# Patient Record
Sex: FEMALE | Race: BLACK OR AFRICAN AMERICAN | NOT HISPANIC OR LATINO
[De-identification: names, ages, dates, MRNs, and addresses within clinical notes are randomized per-mention and may not be internally consistent; named-entity substitution may affect disease eponyms.]

---

## 2018-06-26 PROBLEM — Z00.00 ENCOUNTER FOR PREVENTIVE HEALTH EXAMINATION: Status: ACTIVE | Noted: 2018-06-26

## 2018-06-27 ENCOUNTER — APPOINTMENT (OUTPATIENT)
Dept: ORTHOPEDIC SURGERY | Facility: CLINIC | Age: 83
End: 2018-06-27
Payer: MEDICARE

## 2018-06-27 DIAGNOSIS — S43.004A UNSPECIFIED DISLOCATION OF RIGHT SHOULDER JOINT, INITIAL ENCOUNTER: ICD-10-CM

## 2018-06-27 DIAGNOSIS — M17.12 UNILATERAL PRIMARY OSTEOARTHRITIS, LEFT KNEE: ICD-10-CM

## 2018-06-27 DIAGNOSIS — M17.11 UNILATERAL PRIMARY OSTEOARTHRITIS, RIGHT KNEE: ICD-10-CM

## 2018-06-27 PROCEDURE — 73562 X-RAY EXAM OF KNEE 3: CPT | Mod: 50

## 2018-06-27 PROCEDURE — 73030 X-RAY EXAM OF SHOULDER: CPT | Mod: RT

## 2018-06-27 PROCEDURE — 99205 OFFICE O/P NEW HI 60 MIN: CPT | Mod: 25

## 2018-06-27 PROCEDURE — 20615 TREATMENT OF BONE CYST: CPT

## 2018-07-12 ENCOUNTER — APPOINTMENT (OUTPATIENT)
Dept: ORTHOPEDIC SURGERY | Facility: CLINIC | Age: 83
End: 2018-07-12
Payer: MEDICARE

## 2018-07-12 VITALS
BODY MASS INDEX: 19.32 KG/M2 | HEIGHT: 62 IN | SYSTOLIC BLOOD PRESSURE: 144 MMHG | DIASTOLIC BLOOD PRESSURE: 76 MMHG | WEIGHT: 105 LBS

## 2018-07-12 DIAGNOSIS — Z78.9 OTHER SPECIFIED HEALTH STATUS: ICD-10-CM

## 2018-07-12 DIAGNOSIS — Z60.2 PROBLEMS RELATED TO LIVING ALONE: ICD-10-CM

## 2018-07-12 DIAGNOSIS — Z56.0 UNEMPLOYMENT, UNSPECIFIED: ICD-10-CM

## 2018-07-12 DIAGNOSIS — M19.011 PRIMARY OSTEOARTHRITIS, RIGHT SHOULDER: ICD-10-CM

## 2018-07-12 DIAGNOSIS — M75.121 COMPLETE ROTATOR CUFF TEAR OR RUPTURE OF RIGHT SHOULDER, NOT SPECIFIED AS TRAUMATIC: ICD-10-CM

## 2018-07-12 PROCEDURE — 99214 OFFICE O/P EST MOD 30 MIN: CPT

## 2018-07-12 SDOH — ECONOMIC STABILITY - INCOME SECURITY: UNEMPLOYMENT, UNSPECIFIED: Z56.0

## 2018-07-12 SDOH — SOCIAL STABILITY - SOCIAL INSECURITY: PROBLEMS RELATED TO LIVING ALONE: Z60.2

## 2018-07-13 PROBLEM — M19.011 OSTEOARTHRITIS OF RIGHT SHOULDER: Status: ACTIVE | Noted: 2018-07-13

## 2018-07-13 PROBLEM — M75.121 COMPLETE TEAR OF RIGHT ROTATOR CUFF: Status: ACTIVE | Noted: 2018-07-13

## 2018-10-03 ENCOUNTER — EMERGENCY (EMERGENCY)
Facility: HOSPITAL | Age: 83
LOS: 1 days | Discharge: HOME | End: 2018-10-03
Attending: EMERGENCY MEDICINE | Admitting: EMERGENCY MEDICINE
Payer: MEDICARE

## 2018-10-03 VITALS
RESPIRATION RATE: 16 BRPM | DIASTOLIC BLOOD PRESSURE: 69 MMHG | SYSTOLIC BLOOD PRESSURE: 126 MMHG | OXYGEN SATURATION: 100 % | TEMPERATURE: 99 F | HEART RATE: 67 BPM

## 2018-10-03 LAB
ALBUMIN SERPL ELPH-MCNC: 3.8 G/DL — SIGNIFICANT CHANGE UP (ref 3.3–5)
ALP SERPL-CCNC: 165 U/L — HIGH (ref 40–120)
ALT FLD-CCNC: 16 U/L — SIGNIFICANT CHANGE UP (ref 4–33)
AST SERPL-CCNC: 23 U/L — SIGNIFICANT CHANGE UP (ref 4–32)
BASOPHILS # BLD AUTO: 0.04 K/UL — SIGNIFICANT CHANGE UP (ref 0–0.2)
BASOPHILS NFR BLD AUTO: 0.7 % — SIGNIFICANT CHANGE UP (ref 0–2)
BILIRUB SERPL-MCNC: 0.3 MG/DL — SIGNIFICANT CHANGE UP (ref 0.2–1.2)
BUN SERPL-MCNC: 29 MG/DL — HIGH (ref 7–23)
CALCIUM SERPL-MCNC: 9.5 MG/DL — SIGNIFICANT CHANGE UP (ref 8.4–10.5)
CHLORIDE SERPL-SCNC: 105 MMOL/L — SIGNIFICANT CHANGE UP (ref 98–107)
CO2 SERPL-SCNC: 22 MMOL/L — SIGNIFICANT CHANGE UP (ref 22–31)
CREAT SERPL-MCNC: 0.71 MG/DL — SIGNIFICANT CHANGE UP (ref 0.5–1.3)
EOSINOPHIL # BLD AUTO: 0.12 K/UL — SIGNIFICANT CHANGE UP (ref 0–0.5)
EOSINOPHIL NFR BLD AUTO: 2.1 % — SIGNIFICANT CHANGE UP (ref 0–6)
ERYTHROCYTE [SEDIMENTATION RATE] IN BLOOD: 12 MM/HR — SIGNIFICANT CHANGE UP (ref 4–25)
GLUCOSE SERPL-MCNC: 78 MG/DL — SIGNIFICANT CHANGE UP (ref 70–99)
HCT VFR BLD CALC: 39.4 % — SIGNIFICANT CHANGE UP (ref 34.5–45)
HGB BLD-MCNC: 12.1 G/DL — SIGNIFICANT CHANGE UP (ref 11.5–15.5)
IMM GRANULOCYTES # BLD AUTO: 0.01 # — SIGNIFICANT CHANGE UP
IMM GRANULOCYTES NFR BLD AUTO: 0.2 % — SIGNIFICANT CHANGE UP (ref 0–1.5)
LYMPHOCYTES # BLD AUTO: 1.65 K/UL — SIGNIFICANT CHANGE UP (ref 1–3.3)
LYMPHOCYTES # BLD AUTO: 28.6 % — SIGNIFICANT CHANGE UP (ref 13–44)
MCHC RBC-ENTMCNC: 30.7 % — LOW (ref 32–36)
MCHC RBC-ENTMCNC: 31.3 PG — SIGNIFICANT CHANGE UP (ref 27–34)
MCV RBC AUTO: 101.8 FL — HIGH (ref 80–100)
MONOCYTES # BLD AUTO: 0.56 K/UL — SIGNIFICANT CHANGE UP (ref 0–0.9)
MONOCYTES NFR BLD AUTO: 9.7 % — SIGNIFICANT CHANGE UP (ref 2–14)
NEUTROPHILS # BLD AUTO: 3.38 K/UL — SIGNIFICANT CHANGE UP (ref 1.8–7.4)
NEUTROPHILS NFR BLD AUTO: 58.7 % — SIGNIFICANT CHANGE UP (ref 43–77)
NRBC # FLD: 0 — SIGNIFICANT CHANGE UP
PLATELET # BLD AUTO: 227 K/UL — SIGNIFICANT CHANGE UP (ref 150–400)
PMV BLD: 9.7 FL — SIGNIFICANT CHANGE UP (ref 7–13)
POTASSIUM SERPL-MCNC: 4.3 MMOL/L — SIGNIFICANT CHANGE UP (ref 3.5–5.3)
POTASSIUM SERPL-SCNC: 4.3 MMOL/L — SIGNIFICANT CHANGE UP (ref 3.5–5.3)
PROT SERPL-MCNC: 7.3 G/DL — SIGNIFICANT CHANGE UP (ref 6–8.3)
RBC # BLD: 3.87 M/UL — SIGNIFICANT CHANGE UP (ref 3.8–5.2)
RBC # FLD: 12.9 % — SIGNIFICANT CHANGE UP (ref 10.3–14.5)
SODIUM SERPL-SCNC: 141 MMOL/L — SIGNIFICANT CHANGE UP (ref 135–145)
WBC # BLD: 5.76 K/UL — SIGNIFICANT CHANGE UP (ref 3.8–10.5)
WBC # FLD AUTO: 5.76 K/UL — SIGNIFICANT CHANGE UP (ref 3.8–10.5)

## 2018-10-03 PROCEDURE — 99284 EMERGENCY DEPT VISIT MOD MDM: CPT

## 2018-10-03 PROCEDURE — 73521 X-RAY EXAM HIPS BI 2 VIEWS: CPT | Mod: 26

## 2018-10-03 PROCEDURE — 73660 X-RAY EXAM OF TOE(S): CPT | Mod: 26,RT

## 2018-10-03 RX ORDER — ACETAMINOPHEN 500 MG
650 TABLET ORAL ONCE
Qty: 0 | Refills: 0 | Status: COMPLETED | OUTPATIENT
Start: 2018-10-03 | End: 2018-10-03

## 2018-10-03 RX ADMIN — Medication 650 MILLIGRAM(S): at 17:26

## 2018-10-03 NOTE — CONSULT NOTE ADULT - ASSESSMENT
87 year old F w/ R hallux wound  - pt seen and eval  - Local anesthesia was obtained about R hallux in H block and R great toenail was removed. 1 cc of purulence was expressed under nail. Pyogenic granuloma was noted and hemostasis acheived.   - pt tolerated procedure well no complications.   - no active signs of infection or cellulitis  - Wound dressed with triple antibiotic ointment and dsd. Pt given surgical shoe  - Recommend discharge on PO clinda for 1 week  - Pt instructed to leave dressing intact for 24 hours then change dressing daily with ointment.  - pt instructed to return to ED if any signs of infection  - Pt to follow up with podiatry in one week. Please call [phone]

## 2018-10-03 NOTE — ED ADULT TRIAGE NOTE - CHIEF COMPLAINT QUOTE
c/o right foot great toe pain, discoloration, and wound s/p fall in June and right hip pain with difficulty ambulating. (+) right arm splint in place, and receiving therapy for right shoulder dislocation and ligament damage. No LOC.

## 2018-10-03 NOTE — ED PROVIDER NOTE - PROGRESS NOTE DETAILS
PA SABINA: Patient reassessed, sitting comfortably in chair in NAD, denies any complaints. Pt seen & evaluated by podiatry, I&D with small amount of pus, recommends clindamycin and outpatient f/u. Discussed with attending, patient can be discharged home to f/u with PCP and podiatry. The patient was given verbal and written discharge instructions. Specifically, instructions when to return to the ED and when to seek follow-up from their pcp was discussed. Any specialty follow-up was discussed, including how to make an appointment.  Instructions were discussed in simple, plain language and was understood by the patient. The patient understands that should their symptoms worsen or any new symptoms arise, they should return to the ED immediately for further evaluation. All pt's questions were answered. Patient verbalizes understanding.

## 2018-10-03 NOTE — CONSULT NOTE ADULT - SUBJECTIVE AND OBJECTIVE BOX
Podiatry pager #: [phone]/ [phone]    Patient is a 87y old  Female who presents with a chief complaint of right hallux wound    86 yo F with PMH of arthritis  who presents to the ER c/o right 1st toe wound and pain for duration of 2 weeks atraumatic; also right hip pain s/p fall in June. Reports right big toe looks funny w/ discoloration, pain with ambulation, no injury.  Reports having right hip bone pain after she trip and fell at home more than 3 months ago, pain with sitting and walking. As per sister, patient has right shoulder dislocation and ligament tear, treated by orthopedic, doing occupation therapy. Denies any fever, chills, n/v/d/c, headache, dizziness, chest pain, sob, abdominal pain, dysuria,  recent travel or any other complaints.      PAST MEDICAL & SURGICAL HISTORY:  Arthritis  No significant past surgical history      MEDICATIONS  (STANDING):    MEDICATIONS  (PRN):      Allergies    No Known Allergies    Intolerances        VITALS:    Vital Signs Last 24 Hrs  T(C): 37.1 (03 Oct 2018 14:47), Max: 37.1 (03 Oct 2018 14:47)  T(F): 98.7 (03 Oct 2018 14:47), Max: 98.7 (03 Oct 2018 14:47)  HR: 67 (03 Oct 2018 14:47) (67 - 67)  BP: 126/69 (03 Oct 2018 14:47) (126/69 - 126/69)  BP(mean): --  RR: 16 (03 Oct 2018 14:47) (16 - 16)  SpO2: 100% (03 Oct 2018 14:47) (100% - 100%)    LABS:                          12.1   5.76  )-----------( 227      ( 03 Oct 2018 17:30 )             39.4       10-03    141  |  105  |  29<H>  ----------------------------<  78  4.3   |  22  |  0.71    Ca    9.5      03 Oct 2018 17:30    TPro  7.3  /  Alb  3.8  /  TBili  0.3  /  DBili  x   /  AST  23  /  ALT  16  /  AlkPhos  165<H>  10-03      CAPILLARY BLOOD GLUCOSE              LOWER EXTREMITY PHYSICAL EXAM:    Vasular: DP/PT _/4, B/L, CFT <_ seconds B/L, Temperature gradient _, B/L.   Neuro: Epicritic sensation _ to the level of _, B/L.  Musculoskeletal/Ortho:  Skin:  Wound #1:   Location:  Size:  Depth:  Wound bed:   Drainage:   Odor:   Periwound:  Etiology:     RADIOLOGY & ADDITIONAL STUDIES:

## 2018-10-03 NOTE — ED PROVIDER NOTE - CARE PLAN
Principal Discharge DX:	Abscess  Assessment and plan of treatment:	Follow up with your PMD and podiatry in 48 hours. Keep wound covered and dry.  Take Clindamycin 300mg every 6 hours for 1 week. Take Tylenol 650mg (Two 325 mg pills) every 4-6 hours as needed for pain. any increased pain, redness, fever, chills or any new concerning symptoms return to the emergency department. Principal Discharge DX:	Abscess  Assessment and plan of treatment:	Follow up with your PMD 48 hours and f/u w/ podiatry in 1 week. Keep wound covered and dry.  Take Clindamycin 300mg every 6 hours for 1 week. Take Tylenol 650mg (Two 325 mg pills) every 4-6 hours as needed for pain. any increased pain, redness, fever, chills or any new concerning symptoms return to the emergency department.

## 2018-10-03 NOTE — ED PROVIDER NOTE - ATTENDING CONTRIBUTION TO CARE
I performed a face-to-face evaluation of the patient and performed a history and physical examination. I agree with the history and physical examination.    Mery: Older F, DM, R great toe open wound w/ pus and pain. Pus noted on exam, also likely pyogenic granuloma. Plan: Podiatry, I & D, IV ABx, xray.

## 2018-10-03 NOTE — ED PROVIDER NOTE - MEDICAL DECISION MAKING DETAILS
Mery: Older F, DM, R great toe open wound w/ pus and pain. Pus noted on exam, also likely pyogenic granuloma. Plan: Podiatry, I & D, IV ABx, xray.

## 2018-10-03 NOTE — ED PROVIDER NOTE - SKIN AREA #1
1.5cm open wound tip of right 1st toe with pus drainage on exam, +edema and tenderness, no surround erythema. pitting edema of right foot and leg

## 2018-10-03 NOTE — ED PROVIDER NOTE - PLAN OF CARE
Follow up with your PMD and podiatry in 48 hours. Keep wound covered and dry.  Take Clindamycin 300mg every 6 hours for 1 week. Take Tylenol 650mg (Two 325 mg pills) every 4-6 hours as needed for pain. any increased pain, redness, fever, chills or any new concerning symptoms return to the emergency department. Follow up with your PMD 48 hours and f/u w/ podiatry in 1 week. Keep wound covered and dry.  Take Clindamycin 300mg every 6 hours for 1 week. Take Tylenol 650mg (Two 325 mg pills) every 4-6 hours as needed for pain. any increased pain, redness, fever, chills or any new concerning symptoms return to the emergency department.

## 2018-10-03 NOTE — ED PROVIDER NOTE - OBJECTIVE STATEMENT
88 yo F with PMH of arthritis  who presents to the ER c/o right 1st toe wound and pain for duration of 2 weeks atraumatic; also right hip pain s/p fall in June. Reports right big toe looks funny w/ discoloration, pain with ambulation, no injury.  Reports having right hip bone pain after she trip and fell at home more than 3 months ago, pain with sitting and walking. As per sister, patient has right shoulder dislocation and ligament tear, treated by orthopedic, doing occupation therapy. Denies any fever, chills, n/v/d/c, headache, dizziness, chest pain, sob, abdominal pain, dysuria,  recent travel or any other complaints.

## 2018-10-31 PROBLEM — M19.90 UNSPECIFIED OSTEOARTHRITIS, UNSPECIFIED SITE: Chronic | Status: ACTIVE | Noted: 2018-10-03

## 2018-11-12 ENCOUNTER — APPOINTMENT (OUTPATIENT)
Dept: ORTHOPEDIC SURGERY | Facility: CLINIC | Age: 83
End: 2018-11-12
Payer: MEDICARE

## 2018-11-12 VITALS
DIASTOLIC BLOOD PRESSURE: 62 MMHG | WEIGHT: 105 LBS | BODY MASS INDEX: 19.32 KG/M2 | HEIGHT: 62 IN | SYSTOLIC BLOOD PRESSURE: 122 MMHG | HEART RATE: 60 BPM

## 2018-11-12 DIAGNOSIS — M24.411 RECURRENT DISLOCATION, RIGHT SHOULDER: ICD-10-CM

## 2018-11-12 PROCEDURE — 99213 OFFICE O/P EST LOW 20 MIN: CPT

## 2018-11-13 PROBLEM — M24.411: Status: ACTIVE | Noted: 2018-07-13

## 2018-12-08 ENCOUNTER — INPATIENT (INPATIENT)
Facility: HOSPITAL | Age: 83
LOS: 4 days | Discharge: NURSING, REHAB OR LONG-TERM CARE | End: 2018-12-13
Attending: INTERNAL MEDICINE | Admitting: INTERNAL MEDICINE
Payer: MEDICARE

## 2018-12-08 VITALS
HEIGHT: 61 IN | HEART RATE: 63 BPM | SYSTOLIC BLOOD PRESSURE: 139 MMHG | DIASTOLIC BLOOD PRESSURE: 75 MMHG | RESPIRATION RATE: 17 BRPM | OXYGEN SATURATION: 100 % | WEIGHT: 89.95 LBS

## 2018-12-08 PROCEDURE — 76377 3D RENDER W/INTRP POSTPROCES: CPT | Mod: 26

## 2018-12-08 PROCEDURE — 72125 CT NECK SPINE W/O DYE: CPT | Mod: 26

## 2018-12-08 PROCEDURE — 99285 EMERGENCY DEPT VISIT HI MDM: CPT

## 2018-12-08 PROCEDURE — 70450 CT HEAD/BRAIN W/O DYE: CPT | Mod: 26

## 2018-12-08 RX ORDER — SODIUM CHLORIDE 9 MG/ML
1000 INJECTION INTRAMUSCULAR; INTRAVENOUS; SUBCUTANEOUS ONCE
Qty: 0 | Refills: 0 | Status: COMPLETED | OUTPATIENT
Start: 2018-12-08 | End: 2018-12-08

## 2018-12-08 NOTE — ED ADULT NURSE NOTE - NEURO WDL
Alert and oriented to person, place and not time, memory intact, behavior appropriate to situation, PERRL.

## 2018-12-08 NOTE — ED ADULT NURSE NOTE - CHPI ED NUR SYMPTOMS NEG
no vomiting/no bleeding/no weakness/no deformity/no fever/no numbness/no abrasion/no loss of consciousness/no tingling

## 2018-12-08 NOTE — ED ADULT TRIAGE NOTE - CHIEF COMPLAINT QUOTE
pt found on floor by family pt stated she did not fall went down while putting her shoes ,  denies pain only c/o right shoulder pain duo to dislocated 6 weeks ago

## 2018-12-09 DIAGNOSIS — R74.8 ABNORMAL LEVELS OF OTHER SERUM ENZYMES: ICD-10-CM

## 2018-12-09 DIAGNOSIS — Z29.9 ENCOUNTER FOR PROPHYLACTIC MEASURES, UNSPECIFIED: ICD-10-CM

## 2018-12-09 DIAGNOSIS — Z65.9 PROBLEM RELATED TO UNSPECIFIED PSYCHOSOCIAL CIRCUMSTANCES: ICD-10-CM

## 2018-12-09 DIAGNOSIS — T68.XXXA HYPOTHERMIA, INITIAL ENCOUNTER: ICD-10-CM

## 2018-12-09 LAB
ALBUMIN SERPL ELPH-MCNC: 2.4 G/DL — LOW (ref 3.3–5)
ALP SERPL-CCNC: 135 U/L — HIGH (ref 40–120)
ALT FLD-CCNC: 28 U/L — SIGNIFICANT CHANGE UP (ref 12–78)
ANION GAP SERPL CALC-SCNC: 7 MMOL/L — SIGNIFICANT CHANGE UP (ref 5–17)
APTT BLD: 29.1 SEC — SIGNIFICANT CHANGE UP (ref 28.5–37)
AST SERPL-CCNC: 34 U/L — SIGNIFICANT CHANGE UP (ref 15–37)
BASOPHILS # BLD AUTO: 0.01 K/UL — SIGNIFICANT CHANGE UP (ref 0–0.2)
BASOPHILS # BLD AUTO: 0.01 K/UL — SIGNIFICANT CHANGE UP (ref 0–0.2)
BASOPHILS NFR BLD AUTO: 0.3 % — SIGNIFICANT CHANGE UP (ref 0–2)
BASOPHILS NFR BLD AUTO: 0.4 % — SIGNIFICANT CHANGE UP (ref 0–2)
BILIRUB SERPL-MCNC: 0.2 MG/DL — SIGNIFICANT CHANGE UP (ref 0.2–1.2)
BUN SERPL-MCNC: 22 MG/DL — SIGNIFICANT CHANGE UP (ref 7–23)
CALCIUM SERPL-MCNC: 8.3 MG/DL — LOW (ref 8.5–10.1)
CHLORIDE SERPL-SCNC: 109 MMOL/L — HIGH (ref 96–108)
CHOLEST SERPL-MCNC: 139 MG/DL — SIGNIFICANT CHANGE UP (ref 10–199)
CK MB BLD-MCNC: 5.3 % — HIGH (ref 0–3.5)
CK MB BLD-MCNC: 5.5 % — HIGH (ref 0–3.5)
CK MB CFR SERPL CALC: 11.9 NG/ML — HIGH (ref 0.5–3.6)
CK MB CFR SERPL CALC: 8.4 NG/ML — HIGH (ref 0.5–3.6)
CK SERPL-CCNC: 154 U/L — SIGNIFICANT CHANGE UP (ref 26–192)
CK SERPL-CCNC: 223 U/L — HIGH (ref 26–192)
CO2 SERPL-SCNC: 26 MMOL/L — SIGNIFICANT CHANGE UP (ref 22–31)
CREAT SERPL-MCNC: 0.58 MG/DL — SIGNIFICANT CHANGE UP (ref 0.5–1.3)
EOSINOPHIL # BLD AUTO: 0.03 K/UL — SIGNIFICANT CHANGE UP (ref 0–0.5)
EOSINOPHIL # BLD AUTO: 0.05 K/UL — SIGNIFICANT CHANGE UP (ref 0–0.5)
EOSINOPHIL NFR BLD AUTO: 0.8 % — SIGNIFICANT CHANGE UP (ref 0–6)
EOSINOPHIL NFR BLD AUTO: 1.8 % — SIGNIFICANT CHANGE UP (ref 0–6)
GLUCOSE SERPL-MCNC: 68 MG/DL — LOW (ref 70–99)
HBA1C BLD-MCNC: 5.2 % — SIGNIFICANT CHANGE UP (ref 4–5.6)
HCT VFR BLD CALC: 33.4 % — LOW (ref 34.5–45)
HCT VFR BLD CALC: 34 % — LOW (ref 34.5–45)
HDLC SERPL-MCNC: 80 MG/DL — SIGNIFICANT CHANGE UP
HGB BLD-MCNC: 10.6 G/DL — LOW (ref 11.5–15.5)
HGB BLD-MCNC: 11 G/DL — LOW (ref 11.5–15.5)
IMM GRANULOCYTES NFR BLD AUTO: 0 % — SIGNIFICANT CHANGE UP (ref 0–1.5)
IMM GRANULOCYTES NFR BLD AUTO: 0 % — SIGNIFICANT CHANGE UP (ref 0–1.5)
INR BLD: 1.14 RATIO — SIGNIFICANT CHANGE UP (ref 0.88–1.16)
LACTATE SERPL-SCNC: 1.4 MMOL/L — SIGNIFICANT CHANGE UP (ref 0.7–2)
LACTATE SERPL-SCNC: 2.8 MMOL/L — HIGH (ref 0.7–2)
LIPID PNL WITH DIRECT LDL SERPL: 51 MG/DL — SIGNIFICANT CHANGE UP
LYMPHOCYTES # BLD AUTO: 0.88 K/UL — LOW (ref 1–3.3)
LYMPHOCYTES # BLD AUTO: 0.98 K/UL — LOW (ref 1–3.3)
LYMPHOCYTES # BLD AUTO: 23.4 % — SIGNIFICANT CHANGE UP (ref 13–44)
LYMPHOCYTES # BLD AUTO: 35.4 % — SIGNIFICANT CHANGE UP (ref 13–44)
MAGNESIUM SERPL-MCNC: 2.2 MG/DL — SIGNIFICANT CHANGE UP (ref 1.6–2.6)
MCHC RBC-ENTMCNC: 31.7 GM/DL — LOW (ref 32–36)
MCHC RBC-ENTMCNC: 32 PG — SIGNIFICANT CHANGE UP (ref 27–34)
MCHC RBC-ENTMCNC: 32.4 GM/DL — SIGNIFICANT CHANGE UP (ref 32–36)
MCHC RBC-ENTMCNC: 32.6 PG — SIGNIFICANT CHANGE UP (ref 27–34)
MCV RBC AUTO: 100.9 FL — HIGH (ref 80–100)
MCV RBC AUTO: 100.9 FL — HIGH (ref 80–100)
MONOCYTES # BLD AUTO: 0.3 K/UL — SIGNIFICANT CHANGE UP (ref 0–0.9)
MONOCYTES # BLD AUTO: 0.42 K/UL — SIGNIFICANT CHANGE UP (ref 0–0.9)
MONOCYTES NFR BLD AUTO: 10.8 % — SIGNIFICANT CHANGE UP (ref 2–14)
MONOCYTES NFR BLD AUTO: 11.2 % — SIGNIFICANT CHANGE UP (ref 2–14)
NEUTROPHILS # BLD AUTO: 1.43 K/UL — LOW (ref 1.8–7.4)
NEUTROPHILS # BLD AUTO: 2.42 K/UL — SIGNIFICANT CHANGE UP (ref 1.8–7.4)
NEUTROPHILS NFR BLD AUTO: 51.6 % — SIGNIFICANT CHANGE UP (ref 43–77)
NEUTROPHILS NFR BLD AUTO: 64.3 % — SIGNIFICANT CHANGE UP (ref 43–77)
NRBC # BLD: 0 /100 WBCS — SIGNIFICANT CHANGE UP (ref 0–0)
PHOSPHATE SERPL-MCNC: 2.8 MG/DL — SIGNIFICANT CHANGE UP (ref 2.5–4.5)
PLATELET # BLD AUTO: 265 K/UL — SIGNIFICANT CHANGE UP (ref 150–400)
PLATELET # BLD AUTO: 270 K/UL — SIGNIFICANT CHANGE UP (ref 150–400)
POTASSIUM SERPL-MCNC: 3.6 MMOL/L — SIGNIFICANT CHANGE UP (ref 3.5–5.3)
POTASSIUM SERPL-SCNC: 3.6 MMOL/L — SIGNIFICANT CHANGE UP (ref 3.5–5.3)
PROT SERPL-MCNC: 6.1 GM/DL — SIGNIFICANT CHANGE UP (ref 6–8.3)
PROTHROM AB SERPL-ACNC: 12.8 SEC — SIGNIFICANT CHANGE UP (ref 10–12.9)
RBC # BLD: 3.31 M/UL — LOW (ref 3.8–5.2)
RBC # BLD: 3.37 M/UL — LOW (ref 3.8–5.2)
RBC # FLD: 14.4 % — SIGNIFICANT CHANGE UP (ref 10.3–14.5)
RBC # FLD: 14.6 % — HIGH (ref 10.3–14.5)
SODIUM SERPL-SCNC: 142 MMOL/L — SIGNIFICANT CHANGE UP (ref 135–145)
TOTAL CHOLESTEROL/HDL RATIO MEASUREMENT: 1.7 RATIO — LOW (ref 3.3–7.1)
TRIGL SERPL-MCNC: 38 MG/DL — SIGNIFICANT CHANGE UP (ref 10–149)
TROPONIN I SERPL-MCNC: 0.11 NG/ML — HIGH (ref 0.01–0.04)
TROPONIN I SERPL-MCNC: 0.11 NG/ML — HIGH (ref 0.01–0.04)
TSH SERPL-MCNC: 4.61 UU/ML — HIGH (ref 0.36–3.74)
VIT B12 SERPL-MCNC: >2000 PG/ML — HIGH (ref 232–1245)
WBC # BLD: 2.77 K/UL — LOW (ref 3.8–10.5)
WBC # BLD: 3.76 K/UL — LOW (ref 3.8–10.5)
WBC # FLD AUTO: 2.77 K/UL — LOW (ref 3.8–10.5)
WBC # FLD AUTO: 3.76 K/UL — LOW (ref 3.8–10.5)

## 2018-12-09 PROCEDURE — 12345: CPT | Mod: NC

## 2018-12-09 PROCEDURE — 71045 X-RAY EXAM CHEST 1 VIEW: CPT | Mod: 26

## 2018-12-09 PROCEDURE — 93010 ELECTROCARDIOGRAM REPORT: CPT

## 2018-12-09 PROCEDURE — 99223 1ST HOSP IP/OBS HIGH 75: CPT | Mod: AI

## 2018-12-09 RX ORDER — VANCOMYCIN HCL 1 G
1000 VIAL (EA) INTRAVENOUS EVERY 12 HOURS
Qty: 0 | Refills: 0 | Status: DISCONTINUED | OUTPATIENT
Start: 2018-12-09 | End: 2018-12-09

## 2018-12-09 RX ORDER — ASPIRIN/CALCIUM CARB/MAGNESIUM 324 MG
325 TABLET ORAL ONCE
Qty: 0 | Refills: 0 | Status: COMPLETED | OUTPATIENT
Start: 2018-12-09 | End: 2018-12-09

## 2018-12-09 RX ORDER — SODIUM CHLORIDE 9 MG/ML
1000 INJECTION INTRAMUSCULAR; INTRAVENOUS; SUBCUTANEOUS ONCE
Qty: 0 | Refills: 0 | Status: COMPLETED | OUTPATIENT
Start: 2018-12-09 | End: 2018-12-09

## 2018-12-09 RX ORDER — SODIUM CHLORIDE 9 MG/ML
1000 INJECTION, SOLUTION INTRAVENOUS
Qty: 0 | Refills: 0 | Status: DISCONTINUED | OUTPATIENT
Start: 2018-12-09 | End: 2018-12-11

## 2018-12-09 RX ORDER — VANCOMYCIN HCL 1 G
750 VIAL (EA) INTRAVENOUS EVERY 12 HOURS
Qty: 0 | Refills: 0 | Status: DISCONTINUED | OUTPATIENT
Start: 2018-12-09 | End: 2018-12-09

## 2018-12-09 RX ORDER — ASPIRIN/CALCIUM CARB/MAGNESIUM 324 MG
81 TABLET ORAL DAILY
Qty: 0 | Refills: 0 | Status: DISCONTINUED | OUTPATIENT
Start: 2018-12-09 | End: 2018-12-13

## 2018-12-09 RX ORDER — DEXTROSE 50 % IN WATER 50 %
50 SYRINGE (ML) INTRAVENOUS ONCE
Qty: 0 | Refills: 0 | Status: COMPLETED | OUTPATIENT
Start: 2018-12-09 | End: 2018-12-09

## 2018-12-09 RX ORDER — HEPARIN SODIUM 5000 [USP'U]/ML
5000 INJECTION INTRAVENOUS; SUBCUTANEOUS EVERY 8 HOURS
Qty: 0 | Refills: 0 | Status: DISCONTINUED | OUTPATIENT
Start: 2018-12-09 | End: 2018-12-13

## 2018-12-09 RX ORDER — PIPERACILLIN AND TAZOBACTAM 4; .5 G/20ML; G/20ML
3.38 INJECTION, POWDER, LYOPHILIZED, FOR SOLUTION INTRAVENOUS EVERY 8 HOURS
Qty: 0 | Refills: 0 | Status: DISCONTINUED | OUTPATIENT
Start: 2018-12-09 | End: 2018-12-11

## 2018-12-09 RX ADMIN — PIPERACILLIN AND TAZOBACTAM 25 GRAM(S): 4; .5 INJECTION, POWDER, LYOPHILIZED, FOR SOLUTION INTRAVENOUS at 13:19

## 2018-12-09 RX ADMIN — PIPERACILLIN AND TAZOBACTAM 25 GRAM(S): 4; .5 INJECTION, POWDER, LYOPHILIZED, FOR SOLUTION INTRAVENOUS at 22:16

## 2018-12-09 RX ADMIN — SODIUM CHLORIDE 2000 MILLILITER(S): 9 INJECTION INTRAMUSCULAR; INTRAVENOUS; SUBCUTANEOUS at 00:57

## 2018-12-09 RX ADMIN — Medication 81 MILLIGRAM(S): at 13:17

## 2018-12-09 RX ADMIN — HEPARIN SODIUM 5000 UNIT(S): 5000 INJECTION INTRAVENOUS; SUBCUTANEOUS at 22:17

## 2018-12-09 RX ADMIN — Medication 50 MILLILITER(S): at 02:17

## 2018-12-09 RX ADMIN — SODIUM CHLORIDE 100 MILLILITER(S): 9 INJECTION, SOLUTION INTRAVENOUS at 06:03

## 2018-12-09 RX ADMIN — SODIUM CHLORIDE 2000 MILLILITER(S): 9 INJECTION INTRAMUSCULAR; INTRAVENOUS; SUBCUTANEOUS at 02:17

## 2018-12-09 RX ADMIN — HEPARIN SODIUM 5000 UNIT(S): 5000 INJECTION INTRAVENOUS; SUBCUTANEOUS at 06:04

## 2018-12-09 RX ADMIN — HEPARIN SODIUM 5000 UNIT(S): 5000 INJECTION INTRAVENOUS; SUBCUTANEOUS at 13:17

## 2018-12-09 RX ADMIN — Medication 250 MILLIGRAM(S): at 06:04

## 2018-12-09 RX ADMIN — Medication 325 MILLIGRAM(S): at 02:17

## 2018-12-09 RX ADMIN — SODIUM CHLORIDE 1000 MILLILITER(S): 9 INJECTION INTRAMUSCULAR; INTRAVENOUS; SUBCUTANEOUS at 01:47

## 2018-12-09 RX ADMIN — PIPERACILLIN AND TAZOBACTAM 25 GRAM(S): 4; .5 INJECTION, POWDER, LYOPHILIZED, FOR SOLUTION INTRAVENOUS at 07:20

## 2018-12-09 NOTE — ED PROVIDER NOTE - MEDICAL DECISION MAKING DETAILS
labs and imaging reviewed. patient received ivfs and d50. patient now admitted to medicine for further management.

## 2018-12-09 NOTE — ED PROVIDER NOTE - OBJECTIVE STATEMENT
Pertinent PMH/PSH/FHx/SHx and Review of Systems contained within:  87 yo f with pmh of chronic right shoulder dislocation BIB daughter when found down on her back at her home where she lives by herself. As per daughter that last time she had been seen was about 24 hours prior so it is unknown how long she is on the ground furthermore, patient is unable to say how she got down there. Patient denies all complaints at this time. No aggravating or relieving factors, No fever/chills, No photophobia/eye pain/changes in vision, No ear pain/sore throat/dysphagia, No chest pain/palpitations, no SOB/cough/wheeze/stridor, No abdominal pain, No N/V/D, no dysuria/frequency/discharge, No neck/back pain, no rash, no changes in neurological status/function.

## 2018-12-09 NOTE — H&P ADULT - HISTORY OF PRESENT ILLNESS
Pt admitted for r/o ACS, r/o CVA, in progress. 88 year old woman with only known PMH of chronic right shoulder dislocation brought in by family after being found on the floor at home.  Patient can offer no history and offers no complaints.  She is AAO x 1 and does not recall anything prior to the ambulance picking her up.  As per family, living conditions unsafe as she lives alone and they are unable to offer her any help.    Initial work up only significant for BG of 68 as well as mildly elevated cardiac enzymes.  After admission patient's temp noted to be in the low 90s.  Warming blanket ordered and broad coverage abx ordered.  No clear source of infection at this time.  Lactate 2.8.

## 2018-12-09 NOTE — ED PROVIDER NOTE - PROGRESS NOTE DETAILS
Chilo: daughter reports that she has noticed patient's living condition have be progressively worse and she believes patient is getting more forgetful and is no longer able to care for herself.

## 2018-12-09 NOTE — H&P ADULT - PROBLEM SELECTOR PLAN 2
Unclear context with this patient's presentation  Would repeat x 3, r/o ACS  TTE, cardiology consult Dr. Merritt  Continue ASA  Will send A1C, lipids Unclear context with this patient's presentation  Would repeat x 3, r/o ACS  TTE, cardiology consult Dr. Merritt  Continue ASA  Will send A1C, lipids  As patient can provide no history regarding incident at home, will order MRI brain and carotid dopplers.  Neuro checks q4h.

## 2018-12-09 NOTE — CHART NOTE - NSCHARTNOTEFT_GEN_A_CORE
88 year old woman with only known PMH of chronic right shoulder dislocation brought in by family after being found on the floor at home. see H&P for more information, f/u sespis and syncope workup.

## 2018-12-09 NOTE — H&P ADULT - NSHPLABSRESULTS_GEN_ALL_CORE
Vital Signs Last 24 Hrs  T(C): 34.2 (09 Dec 2018 03:15), Max: 34.4 (09 Dec 2018 02:52)  T(F): 93.6 (09 Dec 2018 03:15), Max: 93.9 (09 Dec 2018 02:52)  HR: 56 (09 Dec 2018 03:15) (56 - 63)  BP: 120/64 (09 Dec 2018 03:15) (120/64 - 139/75)  BP(mean): --  RR: 16 (09 Dec 2018 03:15) (16 - 18)  SpO2: 100% (09 Dec 2018 03:15) (98% - 100%)          LABS:                        11.0   3.76  )-----------( 270      ( 09 Dec 2018 01:11 )             34.0     12-09    142  |  109<H>  |  22  ----------------------------<  68<L>  3.6   |  26  |  0.58    Ca    8.3<L>      09 Dec 2018 01:11    TPro  6.1  /  Alb  2.4<L>  /  TBili  0.2  /  DBili  x   /  AST  34  /  ALT  28  /  AlkPhos  135<H>  12-09    PT/INR - ( 09 Dec 2018 01:11 )   PT: 12.8 sec;   INR: 1.14 ratio         PTT - ( 09 Dec 2018 01:11 )  PTT:29.1 sec      RADIOLOGY & ADDITIONAL STUDIES:    CT head, C spine:  IMPRESSION:  Head CT: No acute intracranial hemorrhage or calvarial fracture. Mild   chronic compressive change.    Cervical spine CT: No acute cervical spine fracture or evidence of   traumatic malalignment. Cervical spondylosis most notable C5/C6 where it   is severe canal stenosis.

## 2018-12-09 NOTE — H&P ADULT - NSHPPHYSICALEXAM_GEN_ALL_CORE
GENERAL: Frail elderly woman in NAD  HEAD:  Atraumatic, Normocephalic  EYES: EOMI, PERRLA, conjunctiva and sclera clear  ENMT: No tonsillar erythema, exudates, or enlargement; Moist mucous membranes, No lesions  NECK: Supple, No JVD, Normal thyroid  NERVOUS SYSTEM:  Alert & Oriented X1, CN 2-12 intact, strength 5/5  CHEST/LUNG: Clear to ascultation bilaterally; No rales, rhonchi, wheezing, or rubs  HEART: Regular rate and rhythm; No murmurs, rubs, or gallops  ABDOMEN: Soft, Nontender, Nondistended; Bowel sounds present  EXTREMITIES: no clubbing, cyanosis, or edema, right shoulder deformity  SKIN: no rashes or lesions   PSYCH: normal affect and behavior

## 2018-12-09 NOTE — H&P ADULT - ASSESSMENT
88 year old woman with only known PMH of chronic right shoulder dislocation brought in by family after being found on the floor at home.  Patient will require admission for at least 2 midnights as detailed below:    IMPROVE VTE Individual Risk Assessment          RISK                                                          Points    [  ] Previous VTE                                                3    [  ] Thrombophilia                                             2    [  ] Lower limb paralysis                                    2        (unable to hold up >15 seconds)      [  ] Current Cancer                                             2         (within 6 months)    [x  ] Immobilization > 24 hrs                              1    [  ] ICU/CCU stay > 24 hours                            1    [x  ] Age > 60                                                    1    IMPROVE VTE Score _____2____

## 2018-12-09 NOTE — H&P ADULT - PROBLEM SELECTOR PLAN 1
No clear source of infection  Would continue broad spectrum abx pending cultures  Repeat lactate, UA, UCx  Warming blanket

## 2018-12-10 DIAGNOSIS — E83.39 OTHER DISORDERS OF PHOSPHORUS METABOLISM: ICD-10-CM

## 2018-12-10 DIAGNOSIS — S43.004A UNSPECIFIED DISLOCATION OF RIGHT SHOULDER JOINT, INITIAL ENCOUNTER: ICD-10-CM

## 2018-12-10 DIAGNOSIS — R62.7 ADULT FAILURE TO THRIVE: ICD-10-CM

## 2018-12-10 LAB
ANION GAP SERPL CALC-SCNC: 9 MMOL/L — SIGNIFICANT CHANGE UP (ref 5–17)
BUN SERPL-MCNC: 22 MG/DL — SIGNIFICANT CHANGE UP (ref 7–23)
CALCIUM SERPL-MCNC: 7.5 MG/DL — LOW (ref 8.5–10.1)
CHLORIDE SERPL-SCNC: 108 MMOL/L — SIGNIFICANT CHANGE UP (ref 96–108)
CO2 SERPL-SCNC: 25 MMOL/L — SIGNIFICANT CHANGE UP (ref 22–31)
CREAT SERPL-MCNC: 0.79 MG/DL — SIGNIFICANT CHANGE UP (ref 0.5–1.3)
CULTURE RESULTS: SIGNIFICANT CHANGE UP
GLUCOSE SERPL-MCNC: 72 MG/DL — SIGNIFICANT CHANGE UP (ref 70–99)
HCT VFR BLD CALC: 35.5 % — SIGNIFICANT CHANGE UP (ref 34.5–45)
HGB BLD-MCNC: 11 G/DL — LOW (ref 11.5–15.5)
MAGNESIUM SERPL-MCNC: 2.1 MG/DL — SIGNIFICANT CHANGE UP (ref 1.6–2.6)
MCHC RBC-ENTMCNC: 31 GM/DL — LOW (ref 32–36)
MCHC RBC-ENTMCNC: 31.7 PG — SIGNIFICANT CHANGE UP (ref 27–34)
MCV RBC AUTO: 102.3 FL — HIGH (ref 80–100)
NRBC # BLD: 0 /100 WBCS — SIGNIFICANT CHANGE UP (ref 0–0)
PHOSPHATE SERPL-MCNC: 2.1 MG/DL — LOW (ref 2.5–4.5)
PLATELET # BLD AUTO: 257 K/UL — SIGNIFICANT CHANGE UP (ref 150–400)
POTASSIUM SERPL-MCNC: 4 MMOL/L — SIGNIFICANT CHANGE UP (ref 3.5–5.3)
POTASSIUM SERPL-SCNC: 4 MMOL/L — SIGNIFICANT CHANGE UP (ref 3.5–5.3)
RBC # BLD: 3.47 M/UL — LOW (ref 3.8–5.2)
RBC # FLD: 14.7 % — HIGH (ref 10.3–14.5)
SODIUM SERPL-SCNC: 142 MMOL/L — SIGNIFICANT CHANGE UP (ref 135–145)
SPECIMEN SOURCE: SIGNIFICANT CHANGE UP
T PALLIDUM AB TITR SER: NEGATIVE — SIGNIFICANT CHANGE UP
WBC # BLD: 5.64 K/UL — SIGNIFICANT CHANGE UP (ref 3.8–10.5)
WBC # FLD AUTO: 5.64 K/UL — SIGNIFICANT CHANGE UP (ref 3.8–10.5)

## 2018-12-10 PROCEDURE — 70551 MRI BRAIN STEM W/O DYE: CPT | Mod: 26

## 2018-12-10 PROCEDURE — 99233 SBSQ HOSP IP/OBS HIGH 50: CPT

## 2018-12-10 PROCEDURE — 93880 EXTRACRANIAL BILAT STUDY: CPT | Mod: 26

## 2018-12-10 RX ORDER — SODIUM,POTASSIUM PHOSPHATES 278-250MG
1 POWDER IN PACKET (EA) ORAL
Qty: 0 | Refills: 0 | Status: COMPLETED | OUTPATIENT
Start: 2018-12-10 | End: 2018-12-12

## 2018-12-10 RX ADMIN — HEPARIN SODIUM 5000 UNIT(S): 5000 INJECTION INTRAVENOUS; SUBCUTANEOUS at 22:07

## 2018-12-10 RX ADMIN — PIPERACILLIN AND TAZOBACTAM 25 GRAM(S): 4; .5 INJECTION, POWDER, LYOPHILIZED, FOR SOLUTION INTRAVENOUS at 14:18

## 2018-12-10 RX ADMIN — HEPARIN SODIUM 5000 UNIT(S): 5000 INJECTION INTRAVENOUS; SUBCUTANEOUS at 06:21

## 2018-12-10 RX ADMIN — Medication 1 TABLET(S): at 18:04

## 2018-12-10 RX ADMIN — PIPERACILLIN AND TAZOBACTAM 25 GRAM(S): 4; .5 INJECTION, POWDER, LYOPHILIZED, FOR SOLUTION INTRAVENOUS at 06:21

## 2018-12-10 RX ADMIN — SODIUM CHLORIDE 100 MILLILITER(S): 9 INJECTION, SOLUTION INTRAVENOUS at 18:05

## 2018-12-10 RX ADMIN — Medication 81 MILLIGRAM(S): at 13:22

## 2018-12-10 RX ADMIN — PIPERACILLIN AND TAZOBACTAM 25 GRAM(S): 4; .5 INJECTION, POWDER, LYOPHILIZED, FOR SOLUTION INTRAVENOUS at 22:08

## 2018-12-10 RX ADMIN — Medication 1 TABLET(S): at 13:23

## 2018-12-10 RX ADMIN — HEPARIN SODIUM 5000 UNIT(S): 5000 INJECTION INTRAVENOUS; SUBCUTANEOUS at 14:18

## 2018-12-10 NOTE — DIETITIAN INITIAL EVALUATION ADULT. - PHYSICAL APPEARANCE
underweight/BMI=18.9; no edema; Nutrition focused physical exam conducted; Subcutaneous fat loss: [moderate ] Orbital fat pads region, [severe ]Buccal fat region, [severe ]Triceps region,  [severe ]Ribs region.  Muscle wasting: [severe ]Temples region, [severe ]Clavicle region, [severe ]Shoulder region, [unable ]Scapula region, [arthritits ]Interosseous region,  [moderate ]thigh region, [moderate ]Calf region/debilitated

## 2018-12-10 NOTE — PROGRESS NOTE ADULT - ASSESSMENT
88 year old woman with only known PMH of chronic right shoulder dislocation brought in by family after being found on the floor at home.  Patient denies LOC and said she slid down to the floor.  She denies chest pain, dyspnea, cough, abdominal pain, urinary symptoms.  Only complain of right shoulder pain.

## 2018-12-10 NOTE — DIETITIAN INITIAL EVALUATION ADULT. - PROBLEM SELECTOR PLAN 2
Unclear context with this patient's presentation  Would repeat x 3, r/o ACS  TTE, cardiology consult Dr. Merritt  Continue ASA  Will send A1C, lipids  As patient can provide no history regarding incident at home, will order MRI brain and carotid dopplers.  Neuro checks q4h.

## 2018-12-10 NOTE — PROGRESS NOTE ADULT - SUBJECTIVE AND OBJECTIVE BOX
Patient is a 88y old  Female who presents with a chief complaint of r/o ACS, failure to thrive, social concerns. (09 Dec 2018 02:04)    INTERVAL HPI/OVERNIGHT EVENTS:  Resting comfortably without complaints other than continued right shoulder pain.    MEDICATIONS  (STANDING):  aspirin  chewable 81 milliGRAM(s) Oral daily  heparin  Injectable 5000 Unit(s) SubCutaneous every 8 hours  piperacillin/tazobactam IVPB. 3.375 Gram(s) IV Intermittent every 8 hours  potassium acid phosphate/sodium acid phosphate tablet (K-PHOS No. 2) 1 Tablet(s) Oral three times a day with meals  sodium chloride 0.45%. 1000 milliLiter(s) (100 mL/Hr) IV Continuous <Continuous>    MEDICATIONS  (PRN):    Allergies: No Known Allergies    REVIEW OF SYSTEMS:  All other systems reviewed and are negative    Vital Signs Last 24 Hrs  T(C): 36.5 (10 Dec 2018 05:42), Max: 37.1 (10 Dec 2018 01:41)  T(F): 97.7 (10 Dec 2018 05:42), Max: 98.8 (10 Dec 2018 01:41)  HR: 63 (10 Dec 2018 05:42) (63 - 76)  BP: 116/54 (10 Dec 2018 05:42) (97/49 - 116/57)  BP(mean): 67 (09 Dec 2018 11:33) (67 - 67)  RR: 18 (10 Dec 2018 05:42) (18 - 18)  SpO2: 100% (10 Dec 2018 05:42) (100% - 100%)  Daily     Daily Weight in k.2 (10 Dec 2018 05:42)  I&O's Summary    CAPILLARY BLOOD GLUCOSE    POCT Blood Glucose.: 121 mg/dL (09 Dec 2018 11:14)    PHYSICAL EXAM:  GENERAL: NAD, well-groomed, well-developed  HEAD:  Atraumatic, Normocephalic  EYES: EOMI, PERRLA, conjunctiva and sclera clear  ENMT: No tonsillar erythema, exudates, or enlargement; Moist mucous membranes, Good dentition, No lesions  NECK: Supple, No JVD, Normal thyroid  NERVOUS SYSTEM:  Alert & Oriented X2, Good concentration; Motor Strength 5/5 B/L upper and lower extremities; DTRs 2+ intact and symmetric  CHEST/LUNG: Clear to percussion bilaterally; No rales, rhonchi, wheezing, or rubs  HEART: Regular rate and rhythm; No murmurs, rubs, or gallops  ABDOMEN: Soft, Nontender, Nondistended; Bowel sounds present  EXTREMITIES:  2+ Peripheral Pulses, No clubbing, cyanosis, or edema  LYMPH: No lymphadenopathy noted  SKIN: No rashes or lesions    LABS:                        11.0   5.64  )-----------( 257      ( 10 Dec 2018 06:24 )             35.5     12-10    142  |  108  |  22  ----------------------------<  72  4.0   |  25  |  0.79    Ca    7.5<L>      10 Dec 2018 06:24  Phos  2.1     12-10  Mg     2.1     12-10    TPro  6.1  /  Alb  2.4<L>  /  TBili  0.2  /  DBili  x   /  AST  34  /  ALT  28  /  AlkPhos  135<H>  12    CARDIAC MARKERS ( 09 Dec 2018 08:37 )  .106 ng/mL / x     / 154 U/L / x     / 8.4 ng/mL  CARDIAC MARKERS ( 09 Dec 2018 01:11 )  .108 ng/mL / x     / 223 U/L / x     / 11.9 ng/mL      PT/INR - ( 09 Dec 2018 01:11 )   PT: 12.8 sec;   INR: 1.14 ratio    PTT - ( 09 Dec 2018 01:11 )  PTT:29.1 sec    RADIOLOGY & ADDITIONAL TESTS:  < from: Xray Chest 1 View- PORTABLE-Urgent (18 @ 00:17) >  EXAM:  XR CHEST PORTABLE URGENT 1V                            PROCEDURE DATE:  2018          INTERPRETATION:  PROCEDURE: AP view of the chest.    CLINICAL INFORMATION: Chest pain.    COMPARISON: 2010.    FINDINGS:        Lungs: The lungs are clear.  Heart: The heart is normal in size.  Mediastinum: The mediastinum is within normal limits.    There is abnormal position of the right humeral head, suspicious for   dislocation.    IMPRESSION:    Clear lungs.  Known right shoulder dislocation, noted as chronic on history and   physical documentation.    < from: CT Cervical Spine No Cont (18 @ 23:44) >  EXAM:  CT CERVICAL SPINE                          [facility]                          EXAM:  CT BRAIN                            PROCEDURE DATE:  2018          INTERPRETATION:  CLINICAL HISTORY: Trauma. Status post fall.    TECHNIQUE: A noncontrast head CT and a noncontrast cervical spine CT were   performed. The head CT was performed with contiguous 5 mm transaxial   images from the skull base to vertex. Images were reviewed in brain,   stroke, subdural and bonewindows.  The cervical spine CT was performed with transaxial thin section images   from the occiput to the T1 level.  Coronal, sagittal and 3-D volume   reformatted images were created from the transaxial source data.  Images   were reviewed in boneand soft tissue windows.    COMPARISON: None available.    FINDINGS:    Head CT:  There is no acute intracranial hemorrhage, mass effect from vasogenic   edema or evidence of acute territorial infarct.  There are patchy areas   of low-attenuation in bihemispheric white matter, nonspecific, but likely   the sequela of chronic microvascular change.    The ventricles, sulci and cisternal spaces are mildly diffusely prominent   although in proportion compatible with age-appropriate cerebral volume   loss.  There is no midline shift or abnormal extra-axial fluid collection.    The calvarium is intact.  There are no osteoblastic or lytic calvarial or   skull base lesions.  The paranasal sinuses and mastoid air cells are   clear.    Cervical spine CT:  There is straightening of the normal cervical lordosis. There are no   acute fractures or evidence of traumatic malalignment. The vertebral body   heights are maintained. Multilevel facet alignment is preserved. The   atlanto-dental interval is within normal limits and the craniocervical   junction is unremarkable. There are no  suspicious osteoblastic or lytic   lesions.    There is no evidence of prevertebral soft tissue swelling or epidural   hematoma.    The spinal canal is congenitally normal in AP and transverse diameters.   There our multilevel degenerative change including intervertebral disc   space narrowing, disc osteophyte complexes, facet arthropathy and   ligamentum flavum thickening. Findings contribute to multilevel canal and   foraminal stenosis, most notable at C5/C6 where there is severe canal   stenosis.    The visualized soft tissues of the neck have an unremarkable unenhanced   appearance. The lung apices are clear.    IMPRESSION:  Head CT: No acute intracranial hemorrhage or calvarial fracture. Mild   chronic compressive change.    Cervical spine CT: No acute cervical spine fracture or evidence of   traumatic malalignment. Cervical spondylosis most notable C5/C6 where it   is severe canal stenosis.      DVT prophylaxis - Heparin

## 2018-12-10 NOTE — DIETITIAN INITIAL EVALUATION ADULT. - NS AS NUTRI DX NUTRIENT
Malnutrition/severe malnutrition in the context of of social issues Malnutrition/severe malnutrition in the context of social issues

## 2018-12-10 NOTE — DIETITIAN INITIAL EVALUATION ADULT. - PERTINENT MEDS FT
aspirin  chewable  heparin  Injectable  piperacillin/tazobactam IVPB.  potassium acid phosphate/sodium acid phosphate tablet (K-PHOS No. 2)  sodium chloride 0.45%.

## 2018-12-10 NOTE — DIETITIAN INITIAL EVALUATION ADULT. - ETIOLOGY
Inadequate energy & protein intake related to arthritis & inability to care for lelf Inadequate energy & protein intake related to arthritis & inability to care for self & limited access to food

## 2018-12-10 NOTE — DIETITIAN INITIAL EVALUATION ADULT. - OTHER INFO
Pt seen for Failure to thrive & BMI=18.9. Pt states she lives alone; pt's niece does food shopping & pt does cooking. Pt uses walker @ home & found on floor & presents with Rt shoulder dislocation. Pt with good dentition (implants) no difficulty chewing or swallowing.  Pt consumed 100% grilled cheese, soup, juice & cake for lunch today. Pt reports last BM x 1(12/10). Pt states sign wt loss x 5 months; pt states unsure reason for wt loss due to adequate po intake & appetite. Pt seen for Failure to thrive & BMI=18.9. Pt states she lives alone; Pt states niece does food shopping & pt does cooking, however per H&P family unable to offer pt help. Pt uses walker @ home & found on floor & presents with Rt shoulder dislocation. Pt with good dentition (implants) no difficulty chewing or swallowing.  Pt consumed 100% grilled cheese, soup, juice & cake for lunch today. Pt reports last BM x 1(12/10). Pt states sign wt loss x 5 months; pt states unsure reason for wt loss due to adequate po intake & appetite, however pt appears to have limited access to food.

## 2018-12-10 NOTE — CHART NOTE - NSCHARTNOTEFT_GEN_A_CORE
Upon Nutritional Assessment by the Registered Dietitian your patient was determined to meet criteria / has evidence of the following diagnosis/diagnoses:          [ ]  Mild Protein Calorie Malnutrition        [ ]  Moderate Protein Calorie Malnutrition        [ x] Severe Protein Calorie Malnutrition        [ ] Unspecified Protein Calorie Malnutrition        [x ] Underweight / BMI <19        [ ] Morbid Obesity / BMI > 40      Findings as based on:  •  Comprehensive nutrition assessment and consultation  •  Calorie counts (nutrient intake analysis)  •  Food acceptance and intake status from observations by staff  •  Follow up  •  Patient education  •  Intervention secondary to interdisciplinary rounds  •   concerns      Treatment:    The following diet has been recommended:  Regular/Ensure Enlive 2 x day(350kcal & 20gm protein)    PROVIDER Section:     By signing this assessment you are acknowledging and agree with the diagnosis/diagnoses assigned by the Registered Dietitian    Comments:

## 2018-12-10 NOTE — PROGRESS NOTE ADULT - PROBLEM SELECTOR PLAN 3
resolved  Does not appear to be infectious origin but will continue IV Zosyn until culture results are negative

## 2018-12-10 NOTE — DIETITIAN INITIAL EVALUATION ADULT. - ORAL INTAKE PTA
Pt states good appetite PTA; Breakfast; oatmeal, bread, PJ & barley or flaxseed tea Lunch; yogurt with pears or prunes & crackers Dinner; fish, soup, vegetable or macaroni & cheese/good

## 2018-12-10 NOTE — DIETITIAN INITIAL EVALUATION ADULT. - PERTINENT LABORATORY DATA
12-10 Na 142 mmol/L Glu 72 mg/dL K+ 4.0 mmol/L Cr 0.79 mg/dL BUN 22 mg/dL Phos 2.1 mg/dL<L> Alb 2.4(1/9)   PAB n/a   Hgb 11.0 g/dL<L> Hct 35.5 % HgA1C 5.2%(12/9)   Glucose, Serum: 72 mg/dL, 12-09 Chol 139 LDL 51 HDL 80 Trig 38, B12>2000

## 2018-12-11 PROCEDURE — 99232 SBSQ HOSP IP/OBS MODERATE 35: CPT

## 2018-12-11 RX ORDER — ATORVASTATIN CALCIUM 80 MG/1
20 TABLET, FILM COATED ORAL AT BEDTIME
Qty: 0 | Refills: 0 | Status: DISCONTINUED | OUTPATIENT
Start: 2018-12-11 | End: 2018-12-13

## 2018-12-11 RX ADMIN — HEPARIN SODIUM 5000 UNIT(S): 5000 INJECTION INTRAVENOUS; SUBCUTANEOUS at 05:09

## 2018-12-11 RX ADMIN — Medication 1 TABLET(S): at 17:46

## 2018-12-11 RX ADMIN — HEPARIN SODIUM 5000 UNIT(S): 5000 INJECTION INTRAVENOUS; SUBCUTANEOUS at 17:46

## 2018-12-11 RX ADMIN — PIPERACILLIN AND TAZOBACTAM 25 GRAM(S): 4; .5 INJECTION, POWDER, LYOPHILIZED, FOR SOLUTION INTRAVENOUS at 05:09

## 2018-12-11 RX ADMIN — Medication 1 TABLET(S): at 09:49

## 2018-12-11 RX ADMIN — ATORVASTATIN CALCIUM 20 MILLIGRAM(S): 80 TABLET, FILM COATED ORAL at 21:34

## 2018-12-11 RX ADMIN — Medication 81 MILLIGRAM(S): at 11:53

## 2018-12-11 RX ADMIN — HEPARIN SODIUM 5000 UNIT(S): 5000 INJECTION INTRAVENOUS; SUBCUTANEOUS at 21:34

## 2018-12-11 NOTE — CONSULT NOTE ADULT - SUBJECTIVE AND OBJECTIVE BOX
HPI:  HPI:  88 year old woman with only known PMH of chronic right shoulder dislocation brought in by family after being found on the floor at home.  Patient can offer no history and offers no complaints.  She is AAO x 1 and does not recall anything prior to the ambulance picking her up.  As per family, living conditions unsafe as she lives alone and they are unable to offer her any help.    Initial work up only significant for BG of 68 as well as mildly elevated cardiac enzymes.  After admission patient's temp noted to be in the low 90s.  Warming blanket ordered and broad coverage abx ordered.  No clear source of infection at this time.  Lactate 2.8. (09 Dec 2018 02:04)      Chief Complaint:  Patient is a 88y old  Female who presents with a chief complaint of r/o ACS, failure to thrive, social concerns. (11 Dec 2018 16:01)      Review of Systems:      ENT:  No sore throat, pain, runny nose, dysphagia  CV:  No pain, palpitations, hypo/hypertension  Resp:  No dyspnea, cough, tachypnea, wheezing  GI:  No pain, nausea, vomiting, diarrhea, constipation  :  No pain, bleeding, incontinence, nocturia           Social History/Family History  SOCHX:   tobacco,  -  alcohol    FMHX: FA/MO  - contributory       Discussed with:  PMD, Family    Physical Exam:    Vital Signs:  Vital Signs Last 24 Hrs  T(C): 36.2 (12 Dec 2018 05:46), Max: 36.3 (11 Dec 2018 13:22)  T(F): 97.2 (12 Dec 2018 05:46), Max: 97.3 (11 Dec 2018 13:22)  HR: 64 (12 Dec 2018 05:46) (64 - 72)  BP: 127/69 (12 Dec 2018 05:46) (114/60 - 139/62)  BP(mean): --  RR: 18 (12 Dec 2018 05:46) (18 - 18)  SpO2: 100% (12 Dec 2018 05:46) (99% - 100%)  Daily     Daily Weight in k.8 (12 Dec 2018 05:46)  I&O's Summary    11 Dec 2018 07:01  -  12 Dec 2018 07:00  --------------------------------------------------------  IN: 480 mL / OUT: 0 mL / NET: 480 mL        Chest:  Full & symmetric excursion, decreased bases  Cardiovascular:  Regular rhythm, S1, S2,   Abdomen:  Soft, non-tender, non-distended, normoactive bowel sounds        POCT Blood Glucose.: 82 mg/dL (12 Dec 2018 05:13)  POCT Blood Glucose.: 69 mg/dL (12 Dec 2018 03:37)        Assessment:  Hypoglycemia and hypothermia on admission  History noted  Mild troponin leak with normal CPK, no acute EKG changes  Not likely AMI,   However,  optimal medication management is noted, conservative management in this pleasant elderly patient  TTE noted, normal EF, no significant AS or VHD  ASA, Statin as tolerated.

## 2018-12-11 NOTE — PROGRESS NOTE ADULT - SUBJECTIVE AND OBJECTIVE BOX
Patient is a 88y old  Female who presents with a chief complaint of r/o ACS, failure to thrive, social concerns. (09 Dec 2018 02:04)    INTERVAL HPI/OVERNIGHT EVENTS:  Resting comfortably without complaints other than continued right shoulder pain.    MEDICATIONS  (STANDING):  aspirin  chewable 81 milliGRAM(s) Oral daily  heparin  Injectable 5000 Unit(s) SubCutaneous every 8 hours  potassium acid phosphate/sodium acid phosphate tablet (K-PHOS No. 2) 1 Tablet(s) Oral three times a day with meals    Allergies: No Known Allergies    REVIEW OF SYSTEMS:  All other systems reviewed and are negative    Vital Signs Last 24 Hrs  T(C): 36.3 (11 Dec 2018 13:22), Max: 36.9 (11 Dec 2018 05:30)  T(F): 97.3 (11 Dec 2018 13:22), Max: 98.4 (11 Dec 2018 05:30)  HR: 68 (11 Dec 2018 13:40) (65 - 70)  BP: 114/60 (11 Dec 2018 13:40) (114/60 - 149/57)  BP(mean): --  RR: 18 (11 Dec 2018 13:40) (17 - 18)  SpO2: 99% (11 Dec 2018 13:40) (98% - 100%)    PHYSICAL EXAM:  GENERAL: NAD, well-groomed, well-developed  HEAD:  Atraumatic, Normocephalic  EYES: EOMI, PERRLA, conjunctiva and sclera clear  ENMT: No tonsillar erythema, exudates, or enlargement; Moist mucous membranes, Good dentition, No lesions  NECK: Supple, No JVD, Normal thyroid  NERVOUS SYSTEM:  Alert & Oriented X2, Good concentration; Motor Strength 5/5 B/L upper and lower extremities; DTRs 2+ intact and symmetric  CHEST/LUNG: Clear to percussion bilaterally; No rales, rhonchi, wheezing, or rubs  HEART: Regular rate and rhythm; No murmurs, rubs, or gallops  ABDOMEN: Soft, Nontender, Nondistended; Bowel sounds present  EXTREMITIES:  2+ Peripheral Pulses, No clubbing, cyanosis, or edema  LYMPH: No lymphadenopathy noted  SKIN: No rashes or lesions    LABS:                        11.0   5.64  )-----------( 257      ( 10 Dec 2018 06:24 )             35.5     12-10    142  |  108  |  22  ----------------------------<  72  4.0   |  25  |  0.79    Ca    7.5<L>      10 Dec 2018 06:24  Phos  2.1     12-10  Mg     2.1     12-10    TPro  6.1  /  Alb  2.4<L>  /  TBili  0.2  /  DBili  x   /  AST  34  /  ALT  28  /  AlkPhos  135<H>  12-09    CARDIAC MARKERS ( 09 Dec 2018 08:37 )  .106 ng/mL / x     / 154 U/L / x     / 8.4 ng/mL  CARDIAC MARKERS ( 09 Dec 2018 01:11 )  .108 ng/mL / x     / 223 U/L / x     / 11.9 ng/mL      PT/INR - ( 09 Dec 2018 01:11 )   PT: 12.8 sec;   INR: 1.14 ratio    PTT - ( 09 Dec 2018 01:11 )  PTT:29.1 sec    RADIOLOGY & ADDITIONAL TESTS:  < from: MR Head No Cont (.10.18 @ 12:06) >  EXAM:  MR BRAIN                            PROCEDURE DATE:  12/10/2018          INTERPRETATION:      REASON FOR EXAM: 88-year-old woman r/o CVA.         TECHNIQUE:   Standardized multiplanar fat and water weighted pulse   sequences were obtained without administration of contrast.      COMPARISON:   CT head 2018.    FINDINGS:  There is prominence of the ventricles and cortical sulci. The midline   structures are intact.    There is no diffusion abnormality to suggest an acute infarction orother   causes of cytotoxic edema.     There are areas of T2 prolongation in the periventricular and deep white   matter distribution suggestive of chronic small vessel ischemic changes    There is normal configuration of cerebellar, mid brain, rhianna and medulla   without tonsillar ectopia.    There is no acute hemorrhage or hydrocephalus. No extra-axial collections   are identified.    There is normal sella / parasellar structures, tectum and pineal region.    The major intra-arterial flow voids are patent.       There is scattered mucosal thickening of the visualized paranasal sinuses   noted involving bilateral ethmoid and right sphenoid sinuses, no   air-fluid levels. Patient is status post bilateral cataract surgery.    The visualized portions of the nasopharynx, mastoids and upper cervical   spine are grossly unremarkable.    IMPRESSION:    No diffusion abnormality to represent acute infarct. Diffuse cerebral   atrophy and chronic small vessel ischemic changes. Chronic mucosal   thickening of paranasal sinuses.    < from: US Duplex Carotid Arteries Complete, Bilateral (12.10.18 @ 11:37) >  EXAM:  US DPLX CAROTIDS COMPL BI                            PROCEDURE DATE:  12/10/2018          INTERPRETATION:  Carotid duplex Doppler ultrasound    Indication:  CVA    Comparison: None    Carotid duplex Doppler ultrasound is performed. Grayscaleultrasound   demonstrates no significant atherosclerotic plaque in the carotid   arteries. The flow velocity measurements during peak systolic phase in   centimeters per second are as the following:    Right CCA 75, ICA 84, ECA 64.  Left CCA 80, ICA 71, ECA 58.    The end diastolic velocity measurement for the right ICA is 13, and  for   the left ICA is 11.    The peak systolic ICA/CCA velocity ratio on the right is 1.1, and on the   left is 0.9.    Both vertebral arteries maintain normal antegrade flow direction.    Impression: No hemodynamically significant internal carotid artery   stenosis by velocity criteria.    < from: TTE Echo Doppler w/o Cont (18 @ 11:43) >  EXAM:  TTE WO CON COMPLETE W DOPPL         PROCEDURE DATE:  2018        INTERPRETATION:  REPORT:    TRANSTHORACIC ECHOCARDIOGRAM REPORT         Patient Name:   BRITTA WILD Patient Location: [facility] Rec #:  [id number]     Accession #:   [id number]  Account #:                     Height:           62.0 in 157.5 cm  YOB: 1930     Weight:           103.6 lb 47.00 kg  Patient Age:    88 years       BSA:              1.45 m²  Patient Gender: F              BP:    114/65 mmHg       Date of Exam: 2018 11:43:52 AM  Sonographer:  THONG    Procedure:     2D Echo/Doppler/Color Doppler Complete.  Indications:   Syncope and collapse - R55  Diagnosis:     Syncope and collapse - R55  Study Details: Technically adequate study.         2D AND M-MODE MEASUREMENTS (normal ranges within parentheses):  Left Ventricle:                  Normal   Aorta/Left Atrium:            Normal  IVSd (2D):              1.23 cm (0.7-1.1) Aortic Root (2D):  3.18 cm   (2.4-3.7)  LVPWd (2D):             1.29 cm (0.7-1.1) Left Atrium (2D):  2.64 cm   (1.9-4.0)  LVIDd (2D):             2.55 cm (3.4-5.7) Right Ventricle:  LVIDs (2D):             1.79 cm           TAPSE:           2.02 cm  LV FS (2D):             29.5 %   (>25%)  Relative Wall Thickness  1.01    (<0.42)    LV DIASTOLIC FUNCTION:  MV Peak E: 0.70 m/s Decel Time: 298 msec  MV Peak A: 0.96 m/s  E/A Ratio: 0.73    SPECTRAL DOPPLER ANALYSIS (where applicable):  Mitral Valve:  MV P1/2 Time: 86.52 msec  MV Area, PHT: 2.54 cm²    Aortic Valve: AoV Max Anatoly: 1.17 m/s AoV Peak P.4 mmHg AoV Mean PG:   3.2 mmHg    LVOT Vmax: 0.90 m/s LVOT VTI: 0.194 m LVOT Diameter:    Tricuspid Valve and PA/RV Systolic Pressure: TR Max Velocity: 2.70 m/s RA   Pressure: 5 mmHg RVSP/PASP: 34.2 mmHg       PHYSICIAN INTERPRETATION:  Left Ventricle:  Left ventricular ejection fraction, by visual estimation, is 60 to 65%.   Spectral Doppler shows impaired relaxation pattern of left ventricular   myocardial filling (Grade I diastolic dysfunction). Normal LV filling   pressures.  Right Ventricle: Normal right ventricular size and function.  Left Atrium: The left atrium is normal in size. Normal left atrial size.  Right Atrium: The right atrium is normal in size. Normal right atrial   size.  Pericardium: Trivial pericardial effusion is present.  Mitral Valve: Structurally normal mitral valve, with normal leaflet   excursion. Trace mitral valve regurgitation is seen.  Tricuspid Valve: Structurally normal tricuspid valve, with normal leaflet   excursion. Mild tricuspid regurgitation is visualized.  Aortic Valve: Normal trileaflet aortic valve with normal opening. Peak Av   velocity is 1.17 m/s, mean transaortic gradient equals 3.2 mmHg. The   aortic valve mean gradient is 3.2 mmHgconsistent with normally opening   aortic valve. Mild aortic valve regurgitation is seen.  Pulmonic Valve: Structurally normal pulmonic valve, with normal leaflet   excursion. Mild pulmonic valve regurgitation.  Aorta: The aortic root is normal in size and structure.  Pulmonary Artery: The main pulmonary artery is normal in size.       Summary:   1. Left ventricular ejection fraction, by visual estimation, is 60 to   65%.   2. Spectral Doppler shows impaired relaxation pattern of left   ventricular myocardial filling (Grade I diastolic dysfunction).   3. There is mild left ventricular hypertrophy.   4. Mild tricuspid regurgitation.   5. Mild aortic regurgitation.   6. Pulmonic valve regurgitation.   7. The aortic valve mean gradient is 3.2 mmHg consistent with normally   opening aortic valve.    [id number] Damion LUNA  Electronically signed on 12/10/2018 at 4:23:29 PM

## 2018-12-11 NOTE — PROGRESS NOTE ADULT - REASON FOR ADMISSION
Found on the floor - failure to thrive, social concerns
r/o ACS, failure to thrive, social concerns.

## 2018-12-12 ENCOUNTER — TRANSCRIPTION ENCOUNTER (OUTPATIENT)
Age: 83
End: 2018-12-12

## 2018-12-12 PROCEDURE — 99232 SBSQ HOSP IP/OBS MODERATE 35: CPT

## 2018-12-12 RX ORDER — DEXTROSE 50 % IN WATER 50 %
15 SYRINGE (ML) INTRAVENOUS ONCE
Qty: 0 | Refills: 0 | Status: DISCONTINUED | OUTPATIENT
Start: 2018-12-12 | End: 2018-12-13

## 2018-12-12 RX ORDER — DEXTROSE 50 % IN WATER 50 %
12.5 SYRINGE (ML) INTRAVENOUS ONCE
Qty: 0 | Refills: 0 | Status: DISCONTINUED | OUTPATIENT
Start: 2018-12-12 | End: 2018-12-13

## 2018-12-12 RX ORDER — GLUCAGON INJECTION, SOLUTION 0.5 MG/.1ML
1 INJECTION, SOLUTION SUBCUTANEOUS ONCE
Qty: 0 | Refills: 0 | Status: DISCONTINUED | OUTPATIENT
Start: 2018-12-12 | End: 2018-12-13

## 2018-12-12 RX ORDER — DEXTROSE 50 % IN WATER 50 %
25 SYRINGE (ML) INTRAVENOUS ONCE
Qty: 0 | Refills: 0 | Status: DISCONTINUED | OUTPATIENT
Start: 2018-12-12 | End: 2018-12-13

## 2018-12-12 RX ORDER — ATORVASTATIN CALCIUM 80 MG/1
1 TABLET, FILM COATED ORAL
Qty: 0 | Refills: 0 | DISCHARGE
Start: 2018-12-12

## 2018-12-12 RX ORDER — ASPIRIN/CALCIUM CARB/MAGNESIUM 324 MG
1 TABLET ORAL
Qty: 0 | Refills: 0 | DISCHARGE
Start: 2018-12-12

## 2018-12-12 RX ADMIN — ATORVASTATIN CALCIUM 20 MILLIGRAM(S): 80 TABLET, FILM COATED ORAL at 22:30

## 2018-12-12 RX ADMIN — HEPARIN SODIUM 5000 UNIT(S): 5000 INJECTION INTRAVENOUS; SUBCUTANEOUS at 12:07

## 2018-12-12 RX ADMIN — Medication 1 TABLET(S): at 12:07

## 2018-12-12 RX ADMIN — HEPARIN SODIUM 5000 UNIT(S): 5000 INJECTION INTRAVENOUS; SUBCUTANEOUS at 22:30

## 2018-12-12 RX ADMIN — Medication 1 TABLET(S): at 07:42

## 2018-12-12 RX ADMIN — Medication 81 MILLIGRAM(S): at 12:07

## 2018-12-12 RX ADMIN — HEPARIN SODIUM 5000 UNIT(S): 5000 INJECTION INTRAVENOUS; SUBCUTANEOUS at 05:28

## 2018-12-12 NOTE — DISCHARGE NOTE ADULT - MEDICATION SUMMARY - MEDICATIONS TO STOP TAKING
I will STOP taking the medications listed below when I get home from the hospital:    clindamycin 300 mg oral capsule  -- 1 cap(s) by mouth every 6 hours   -- Finish all this medication unless otherwise directed by prescriber.  Medication should be taken with plenty of water.

## 2018-12-12 NOTE — DISCHARGE NOTE ADULT - MEDICATION SUMMARY - MEDICATIONS TO TAKE
I will START or STAY ON the medications listed below when I get home from the hospital:    aspirin 81 mg oral tablet, chewable  -- 1 tab(s) by mouth once a day  -- Indication: For Elevated troponin    atorvastatin 20 mg oral tablet  -- 1 tab(s) by mouth once a day (at bedtime)  -- Indication: For Elevated troponin I will START or STAY ON the medications listed below when I get home from the hospital:    aspirin 81 mg oral tablet, chewable  -- 1 tab(s) by mouth once a day  -- Indication: For Elevated troponin    atorvastatin 20 mg oral tablet  -- 1 tab(s) by mouth once a day (at bedtime)  -- Indication: For Elevated troponin    bisacodyl 5 mg oral delayed release tablet  -- 1 tab(s) by mouth every 12 hours, As needed, Constipation  -- Indication: For constipation    senna oral tablet  -- 2 tab(s) by mouth once a day (at bedtime)  -- Indication: For constipation

## 2018-12-12 NOTE — DISCHARGE NOTE ADULT - PLAN OF CARE
improve nutrition and ambulation resolved ACS ruled out continue ASA and Lipitor  outpatient Cardiology follow-up chronic - outpatient Orthopedic follow-up

## 2018-12-12 NOTE — DISCHARGE NOTE ADULT - PATIENT PORTAL LINK FT
You can access the [facility], offered by [facility], by registering with the following website: [email or web address]

## 2018-12-12 NOTE — DISCHARGE NOTE ADULT - CARE PLAN
Principal Discharge DX:	Failure to thrive in adult  Goal:	improve nutrition and ambulation  Assessment and plan of treatment:	improve nutrition and ambulation  Secondary Diagnosis:	Hypophosphatemia  Assessment and plan of treatment:	resolved  Secondary Diagnosis:	Hypothermia, initial encounter  Assessment and plan of treatment:	resolved  Secondary Diagnosis:	Elevated troponin  Goal:	ACS ruled out  Assessment and plan of treatment:	continue ASA and Lipitor  outpatient Cardiology follow-up  Secondary Diagnosis:	Dislocated shoulder, right, initial encounter Principal Discharge DX:	Failure to thrive in adult  Goal:	improve nutrition and ambulation  Assessment and plan of treatment:	improve nutrition and ambulation  Secondary Diagnosis:	Hypophosphatemia  Assessment and plan of treatment:	resolved  Secondary Diagnosis:	Hypothermia, initial encounter  Assessment and plan of treatment:	resolved  Secondary Diagnosis:	Elevated troponin  Goal:	ACS ruled out  Assessment and plan of treatment:	continue ASA and Lipitor  outpatient Cardiology follow-up  Secondary Diagnosis:	Dislocated shoulder, right, initial encounter  Assessment and plan of treatment:	chronic - outpatient Orthopedic follow-up

## 2018-12-12 NOTE — DISCHARGE NOTE ADULT - HOSPITAL COURSE
88 year old woman with only known PMH of chronic right shoulder dislocation brought in by family after being found on the floor at home.  Patient denies LOC and said she slid down to the floor.  She denies chest pain, dyspnea, cough, abdominal pain, urinary symptoms.  Only complain of right shoulder pain.  She was seen by Cardiology and cleared for discharge with medical management.  Advise Orthopedic follow-up as outpatient.

## 2018-12-12 NOTE — DISCHARGE NOTE ADULT - SECONDARY DIAGNOSIS.
Hypophosphatemia Hypothermia, initial encounter Elevated troponin Dislocated shoulder, right, initial encounter

## 2018-12-12 NOTE — DISCHARGE NOTE ADULT - CARE PROVIDER_API CALL
Juan Merritt), Cardiology  [address] [facility]  [address] [place] [address]  Phone: [phone]  Fax: [phone]

## 2018-12-13 VITALS
RESPIRATION RATE: 18 BRPM | TEMPERATURE: 98 F | SYSTOLIC BLOOD PRESSURE: 117 MMHG | HEART RATE: 76 BPM | DIASTOLIC BLOOD PRESSURE: 62 MMHG | OXYGEN SATURATION: 98 %

## 2018-12-13 PROCEDURE — 99239 HOSP IP/OBS DSCHRG MGMT >30: CPT

## 2018-12-13 RX ORDER — SENNA PLUS 8.6 MG/1
2 TABLET ORAL
Qty: 30 | Refills: 0
Start: 2018-12-13

## 2018-12-13 RX ORDER — SENNA PLUS 8.6 MG/1
2 TABLET ORAL AT BEDTIME
Qty: 0 | Refills: 0 | Status: DISCONTINUED | OUTPATIENT
Start: 2018-12-13 | End: 2018-12-13

## 2018-12-13 RX ADMIN — Medication 5 MILLIGRAM(S): at 06:45

## 2018-12-13 RX ADMIN — HEPARIN SODIUM 5000 UNIT(S): 5000 INJECTION INTRAVENOUS; SUBCUTANEOUS at 06:39

## 2018-12-13 RX ADMIN — Medication 81 MILLIGRAM(S): at 11:35

## 2018-12-13 RX ADMIN — HEPARIN SODIUM 5000 UNIT(S): 5000 INJECTION INTRAVENOUS; SUBCUTANEOUS at 11:35

## 2018-12-14 LAB
CULTURE RESULTS: SIGNIFICANT CHANGE UP
CULTURE RESULTS: SIGNIFICANT CHANGE UP
SPECIMEN SOURCE: SIGNIFICANT CHANGE UP
SPECIMEN SOURCE: SIGNIFICANT CHANGE UP

## 2018-12-18 DIAGNOSIS — R55 SYNCOPE AND COLLAPSE: ICD-10-CM

## 2018-12-18 DIAGNOSIS — E16.2 HYPOGLYCEMIA, UNSPECIFIED: ICD-10-CM

## 2018-12-18 DIAGNOSIS — M19.90 UNSPECIFIED OSTEOARTHRITIS, UNSPECIFIED SITE: ICD-10-CM

## 2018-12-18 DIAGNOSIS — R74.8 ABNORMAL LEVELS OF OTHER SERUM ENZYMES: ICD-10-CM

## 2018-12-18 DIAGNOSIS — R62.7 ADULT FAILURE TO THRIVE: ICD-10-CM

## 2018-12-18 DIAGNOSIS — R68.0 HYPOTHERMIA, NOT ASSOCIATED WITH LOW ENVIRONMENTAL TEMPERATURE: ICD-10-CM

## 2018-12-18 DIAGNOSIS — M24.411 RECURRENT DISLOCATION, RIGHT SHOULDER: ICD-10-CM

## 2018-12-18 DIAGNOSIS — E43 UNSPECIFIED SEVERE PROTEIN-CALORIE MALNUTRITION: ICD-10-CM

## 2018-12-18 DIAGNOSIS — E83.39 OTHER DISORDERS OF PHOSPHORUS METABOLISM: ICD-10-CM

## 2019-06-02 ENCOUNTER — INPATIENT (INPATIENT)
Facility: HOSPITAL | Age: 84
LOS: 1 days | Discharge: HOME | End: 2019-06-04
Attending: INTERNAL MEDICINE | Admitting: INTERNAL MEDICINE
Payer: MEDICARE

## 2019-06-02 VITALS
WEIGHT: 119.93 LBS | HEART RATE: 88 BPM | TEMPERATURE: 99 F | RESPIRATION RATE: 18 BRPM | SYSTOLIC BLOOD PRESSURE: 146 MMHG | OXYGEN SATURATION: 99 % | DIASTOLIC BLOOD PRESSURE: 89 MMHG

## 2019-06-02 DIAGNOSIS — R55 SYNCOPE AND COLLAPSE: ICD-10-CM

## 2019-06-02 DIAGNOSIS — H26.9 UNSPECIFIED CATARACT: Chronic | ICD-10-CM

## 2019-06-02 LAB
ALBUMIN SERPL ELPH-MCNC: 3 G/DL — LOW (ref 3.3–5)
ALP SERPL-CCNC: 129 U/L — HIGH (ref 40–120)
ALT FLD-CCNC: 15 U/L — SIGNIFICANT CHANGE UP (ref 12–78)
ANION GAP SERPL CALC-SCNC: 8 MMOL/L — SIGNIFICANT CHANGE UP (ref 5–17)
APTT BLD: 27.5 SEC — LOW (ref 28.5–37)
AST SERPL-CCNC: 16 U/L — SIGNIFICANT CHANGE UP (ref 15–37)
BILIRUB SERPL-MCNC: 0.3 MG/DL — SIGNIFICANT CHANGE UP (ref 0.2–1.2)
BUN SERPL-MCNC: 17 MG/DL — SIGNIFICANT CHANGE UP (ref 7–23)
CALCIUM SERPL-MCNC: 9.1 MG/DL — SIGNIFICANT CHANGE UP (ref 8.5–10.1)
CHLORIDE SERPL-SCNC: 103 MMOL/L — SIGNIFICANT CHANGE UP (ref 96–108)
CO2 SERPL-SCNC: 29 MMOL/L — SIGNIFICANT CHANGE UP (ref 22–31)
CREAT SERPL-MCNC: 0.84 MG/DL — SIGNIFICANT CHANGE UP (ref 0.5–1.3)
GLUCOSE BLDC GLUCOMTR-MCNC: 161 MG/DL — HIGH (ref 70–99)
GLUCOSE SERPL-MCNC: 157 MG/DL — HIGH (ref 70–99)
HCT VFR BLD CALC: 35.4 % — SIGNIFICANT CHANGE UP (ref 34.5–45)
HGB BLD-MCNC: 11.1 G/DL — LOW (ref 11.5–15.5)
INR BLD: 1.08 RATIO — SIGNIFICANT CHANGE UP (ref 0.88–1.16)
LACTATE SERPL-SCNC: 1.5 MMOL/L — SIGNIFICANT CHANGE UP (ref 0.7–2)
MCHC RBC-ENTMCNC: 31.1 PG — SIGNIFICANT CHANGE UP (ref 27–34)
MCHC RBC-ENTMCNC: 31.4 GM/DL — LOW (ref 32–36)
MCV RBC AUTO: 99.2 FL — SIGNIFICANT CHANGE UP (ref 80–100)
NRBC # BLD: 0 /100 WBCS — SIGNIFICANT CHANGE UP (ref 0–0)
NT-PROBNP SERPL-SCNC: 256 PG/ML — SIGNIFICANT CHANGE UP (ref 0–450)
PLATELET # BLD AUTO: 298 K/UL — SIGNIFICANT CHANGE UP (ref 150–400)
POTASSIUM SERPL-MCNC: 3.9 MMOL/L — SIGNIFICANT CHANGE UP (ref 3.5–5.3)
POTASSIUM SERPL-SCNC: 3.9 MMOL/L — SIGNIFICANT CHANGE UP (ref 3.5–5.3)
PROT SERPL-MCNC: 7.2 GM/DL — SIGNIFICANT CHANGE UP (ref 6–8.3)
PROTHROM AB SERPL-ACNC: 12.1 SEC — SIGNIFICANT CHANGE UP (ref 10–12.9)
RBC # BLD: 3.57 M/UL — LOW (ref 3.8–5.2)
RBC # FLD: 14.1 % — SIGNIFICANT CHANGE UP (ref 10.3–14.5)
SODIUM SERPL-SCNC: 140 MMOL/L — SIGNIFICANT CHANGE UP (ref 135–145)
TROPONIN I SERPL-MCNC: 0.14 NG/ML — HIGH (ref 0.01–0.04)
WBC # BLD: 4.28 K/UL — SIGNIFICANT CHANGE UP (ref 3.8–10.5)
WBC # FLD AUTO: 4.28 K/UL — SIGNIFICANT CHANGE UP (ref 3.8–10.5)

## 2019-06-02 PROCEDURE — 70450 CT HEAD/BRAIN W/O DYE: CPT | Mod: 26

## 2019-06-02 PROCEDURE — 99223 1ST HOSP IP/OBS HIGH 75: CPT | Mod: AI

## 2019-06-02 PROCEDURE — 99285 EMERGENCY DEPT VISIT HI MDM: CPT

## 2019-06-02 PROCEDURE — 93010 ELECTROCARDIOGRAM REPORT: CPT

## 2019-06-02 PROCEDURE — 71045 X-RAY EXAM CHEST 1 VIEW: CPT | Mod: 26

## 2019-06-02 RX ORDER — ASPIRIN/CALCIUM CARB/MAGNESIUM 324 MG
81 TABLET ORAL DAILY
Refills: 0 | Status: DISCONTINUED | OUTPATIENT
Start: 2019-06-02 | End: 2019-06-04

## 2019-06-02 RX ORDER — ENOXAPARIN SODIUM 100 MG/ML
40 INJECTION SUBCUTANEOUS EVERY 24 HOURS
Refills: 0 | Status: DISCONTINUED | OUTPATIENT
Start: 2019-06-02 | End: 2019-06-04

## 2019-06-02 RX ORDER — ASPIRIN/CALCIUM CARB/MAGNESIUM 324 MG
325 TABLET ORAL ONCE
Refills: 0 | Status: COMPLETED | OUTPATIENT
Start: 2019-06-02 | End: 2019-06-02

## 2019-06-02 RX ORDER — SODIUM CHLORIDE 9 MG/ML
500 INJECTION INTRAMUSCULAR; INTRAVENOUS; SUBCUTANEOUS ONCE
Refills: 0 | Status: COMPLETED | OUTPATIENT
Start: 2019-06-02 | End: 2019-06-02

## 2019-06-02 RX ADMIN — Medication 325 MILLIGRAM(S): at 23:04

## 2019-06-02 RX ADMIN — SODIUM CHLORIDE 500 MILLILITER(S): 9 INJECTION INTRAMUSCULAR; INTRAVENOUS; SUBCUTANEOUS at 21:06

## 2019-06-02 NOTE — H&P ADULT - NSHPPHYSICALEXAM_GEN_ALL_CORE
Vital Signs Last 24 Hrs  T(C): 37 (02 Jun 2019 16:39), Max: 37 (02 Jun 2019 16:39)  T(F): 98.6 (02 Jun 2019 16:39), Max: 98.6 (02 Jun 2019 16:39)  HR: 88 (02 Jun 2019 16:39) (88 - 88)  BP: 146/89 (02 Jun 2019 16:39) (146/89 - 146/89)  BP(mean): --  RR: 18 (02 Jun 2019 16:39) (18 - 18)  SpO2: 99% (02 Jun 2019 16:39) (99% - 99%)    PHYSICAL EXAM:    GENERAL: thin elderly female nad  HEAD:  Atraumatic, Normocephalic  EYES: EOMI, PERRLA, conjunctiva and sclera clear  ENMT: No tonsillar erythema, exudates, or enlargement; Moist mucous membranes, No lesions  NECK: Supple, No JVD, Normal thyroid  NERVOUS SYSTEM:  Alert & Oriented X3, Motor Strength 5/5 B/L upper and lower extremities except rue, decreased rom and mild contracture of  hand;  CHEST/LUNG: Clear to percussion bilaterally; No rales, rhonchi, wheezing, or rubs  HEART: Regular rate and rhythm; No rubs, or gallops, +S1,S2  ABDOMEN: Soft, Nontender, Nondistended; Bowel sounds present  EXTREMITIES:  2+ Peripheral Pulses, No clubbing, cyanosis, or edema  LYMPH: No cervical adenopathy  RECTAL: deferred  BREAST: deferred  : deferred  SKIN: No rashes or lesions    IMPROVE VTE Individual Risk Assessment          RISK                                                          Points  [  ] Previous VTE                                                3  [  ] Thrombophilia                                             2  [  ] Lower limb paralysis                                    2        (unable to hold up >15 seconds)    [  ] Current Cancer                                             2         (within 6 months)  [x  ] Immobilization > 24 hrs                              1  [  ] ICU/CCU stay > 24 hours                            1  [x  ] Age > 60                                                    1  IMPROVE VTE Score ____2_____

## 2019-06-02 NOTE — ED PROVIDER NOTE - CARE PLAN
Principal Discharge DX:	Syncope and collapse Principal Discharge DX:	Syncope and collapse  Secondary Diagnosis:	Elevated troponin

## 2019-06-02 NOTE — H&P ADULT - NSHPLABSRESULTS_GEN_ALL_CORE
LABS:                        11.1   4.28  )-----------( 298      ( 02 Jun 2019 20:09 )             35.4     06-02    140  |  103  |  17  ----------------------------<  157<H>  3.9   |  29  |  0.84    Ca    9.1      02 Jun 2019 20:09    TPro  7.2  /  Alb  3.0<L>  /  TBili  0.3  /  DBili  x   /  AST  16  /  ALT  15  /  AlkPhos  129<H>  06-02    PT/INR - ( 02 Jun 2019 20:09 )   PT: 12.1 sec;   INR: 1.08 ratio         PTT - ( 02 Jun 2019 20:09 )  PTT:27.5 sec    CAPILLARY BLOOD GLUCOSE      POCT Blood Glucose.: 161 mg/dL (02 Jun 2019 16:44)      RADIOLOGY & ADDITIONAL TESTS:    Imaging Personally Reviewed:  [ X] YES  [ ] NO

## 2019-06-02 NOTE — ED PROVIDER NOTE - OBJECTIVE STATEMENT
88yoF; with pmh signif for HTN; now p/w syncope.  patient states she was sitting eating lunch and felt dizziness. sister witnessed syncope.  denies cp/sob/palp prior to syncope. denies headache. denies n/v. denies numbness/tingling. denies focal weakness. denies f/c/s. denies sick contacts.  PMH: HTN  SOCIAL: No tobacco/illicit substance use/EtOH

## 2019-06-02 NOTE — ED PROVIDER NOTE - PHYSICAL EXAMINATION
Gen: Alert, NAD  Head: NC, AT, PERRL, EOMI, normal lids/conjunctiva  Neck: +supple, no tenderness/meningismus/JVD, +Trachea midline  Pulm: Bilateral BS, normal resp effort, no wheeze/stridor/retractions  CV: RRR, no M/R/G, +dist pulses  Abd: soft, NT/ND, +BS, no hepatosplenomegaly  Mskel: no edema/erythema/cyanosis  Neuro: AAOx2, no sensory/motor deficits, CN 2-12 intact

## 2019-06-02 NOTE — ED PROVIDER NOTE - CLINICAL SUMMARY MEDICAL DECISION MAKING FREE TEXT BOX
Patient arrived s/p syncope while sitting, elevated troponin, negative ct head, will admit for cardiac monitoring.

## 2019-06-02 NOTE — H&P ADULT - ASSESSMENT
88 year old woman with only known PMH of chronic right shoulder dislocation w/syncope and elevated troponin

## 2019-06-02 NOTE — H&P ADULT - HISTORY OF PRESENT ILLNESS
Pt is a 89 y/o female w/pmhx of chronic right shoulder dislocation and OA comes in after syncopal event.  Daughter states they were having dinner then she went to answer a call and when called mother no respone and when daughter went to pt she was slumped over and wasnt arousable for almost a minute.  and on coming about she was at baseline.  pt denies any fever, chills, sob, cp, palpitations, n/v/d/c no travels or sick contacts.  pt is currently w/o complains.  she is on no meds, only supplements.

## 2019-06-02 NOTE — ED ADULT TRIAGE NOTE - CHIEF COMPLAINT QUOTE
syncope this afternoon. denies fall. denies any medical history. pt states she felt dizzy prior to incident.

## 2019-06-02 NOTE — ED ADULT NURSE NOTE - ED STAT RN HANDOFF DETAILS 2
Handoff given to LUANNE Richardson pt paula neral condition remains stable no complaints voiced. family at bedside.  pt pending aspirin endorsed for continued care.

## 2019-06-03 DIAGNOSIS — I10 ESSENTIAL (PRIMARY) HYPERTENSION: ICD-10-CM

## 2019-06-03 PROBLEM — S43.004A UNSPECIFIED DISLOCATION OF RIGHT SHOULDER JOINT, INITIAL ENCOUNTER: Chronic | Status: ACTIVE | Noted: 2018-12-08

## 2019-06-03 LAB
-  COAGULASE NEGATIVE STAPHYLOCOCCUS: SIGNIFICANT CHANGE UP
APPEARANCE UR: CLEAR — SIGNIFICANT CHANGE UP
BILIRUB UR-MCNC: NEGATIVE — SIGNIFICANT CHANGE UP
CK MB BLD-MCNC: <2.2 % — SIGNIFICANT CHANGE UP (ref 0–3.5)
CK MB CFR SERPL CALC: <1 NG/ML — SIGNIFICANT CHANGE UP (ref 0.5–3.6)
CK SERPL-CCNC: 45 U/L — SIGNIFICANT CHANGE UP (ref 26–192)
COLOR SPEC: YELLOW — SIGNIFICANT CHANGE UP
DIFF PNL FLD: NEGATIVE — SIGNIFICANT CHANGE UP
GLUCOSE UR QL: NEGATIVE MG/DL — SIGNIFICANT CHANGE UP
GRAM STN FLD: SIGNIFICANT CHANGE UP
KETONES UR-MCNC: NEGATIVE — SIGNIFICANT CHANGE UP
LEUKOCYTE ESTERASE UR-ACNC: NEGATIVE — SIGNIFICANT CHANGE UP
METHOD TYPE: SIGNIFICANT CHANGE UP
NITRITE UR-MCNC: NEGATIVE — SIGNIFICANT CHANGE UP
PH UR: 8 — SIGNIFICANT CHANGE UP (ref 5–8)
PROT UR-MCNC: NEGATIVE MG/DL — SIGNIFICANT CHANGE UP
SP GR SPEC: 1.01 — SIGNIFICANT CHANGE UP (ref 1.01–1.02)
SPECIMEN SOURCE: SIGNIFICANT CHANGE UP
TROPONIN I SERPL-MCNC: 0.14 NG/ML — HIGH (ref 0.01–0.04)
UROBILINOGEN FLD QL: NEGATIVE MG/DL — SIGNIFICANT CHANGE UP

## 2019-06-03 PROCEDURE — 99232 SBSQ HOSP IP/OBS MODERATE 35: CPT

## 2019-06-03 PROCEDURE — 93306 TTE W/DOPPLER COMPLETE: CPT | Mod: 26

## 2019-06-03 RX ORDER — VANCOMYCIN HCL 1 G
1000 VIAL (EA) INTRAVENOUS ONCE
Refills: 0 | Status: COMPLETED | OUTPATIENT
Start: 2019-06-03 | End: 2019-06-03

## 2019-06-03 RX ORDER — HYDRALAZINE HCL 50 MG
10 TABLET ORAL EVERY 8 HOURS
Refills: 0 | Status: DISCONTINUED | OUTPATIENT
Start: 2019-06-03 | End: 2019-06-04

## 2019-06-03 RX ORDER — ACETAMINOPHEN 500 MG
650 TABLET ORAL EVERY 6 HOURS
Refills: 0 | Status: DISCONTINUED | OUTPATIENT
Start: 2019-06-03 | End: 2019-06-04

## 2019-06-03 RX ADMIN — Medication 10 MILLIGRAM(S): at 16:09

## 2019-06-03 RX ADMIN — Medication 10 MILLIGRAM(S): at 21:44

## 2019-06-03 RX ADMIN — Medication 650 MILLIGRAM(S): at 10:56

## 2019-06-03 RX ADMIN — ENOXAPARIN SODIUM 40 MILLIGRAM(S): 100 INJECTION SUBCUTANEOUS at 12:59

## 2019-06-03 RX ADMIN — Medication 250 MILLIGRAM(S): at 22:33

## 2019-06-03 RX ADMIN — Medication 81 MILLIGRAM(S): at 13:00

## 2019-06-03 NOTE — PROGRESS NOTE ADULT - SUBJECTIVE AND OBJECTIVE BOX
Patient is a 88y old  Female who presents with a chief complaint of passed out (2019 23:30)      INTERVAL HPI/ OVERNIGHT EVENTS: Pt was seen and examined at bedside today, No significant overnight events, pt denies any complaints at this time.  MEDICATIONS  (STANDING):  aspirin enteric coated 81 milliGRAM(s) Oral daily  enoxaparin Injectable 40 milliGRAM(s) SubCutaneous every 24 hours  hydrALAZINE 10 milliGRAM(s) Oral every 8 hours    MEDICATIONS  (PRN):  acetaminophen   Tablet .. 650 milliGRAM(s) Oral every 6 hours PRN Mild Pain (1 - 3)  hydrALAZINE Injectable 10 milliGRAM(s) IV Push every 8 hours PRN systolic BP > 160      Allergies    No Known Allergies    Intolerances        REVIEW OF SYSTEMS:    Unable to examine due to [ ] Encephalopathy [ ] Advanced Dementia [ ] Expressive Aphasia [ ] Non-verbal patient    CONSTITUTIONAL: No fever, NO generalized weakness/Fatigue, No weight loss  EYES: No eye pain, visual disturbances, or discharge  ENMT:  No difficulty hearing, tinnitus, vertigo; No sinus or throat pain  NECK: No pain or stiffness  RESPIRATORY: No shortness of breath,  cough, wheezing, sputum or hemoptysis   CARDIOVASCULAR: No chest pain, palpitations, or leg swelling  GASTROINTESTINAL: No abdominal pain. No nausea, vomiting, diarrhea or constipation. No melena or hematochezia.  GENITOURINARY: No dysuria, frequency, hematuria, or incontinence  NEUROLOGICAL: No headaches, Dizziness, memory loss, loss of strength, numbness, or tremors  SKIN: No itching, burning, rashes, or lesions   MUSCULOSKELETAL: No joint pain or swelling; No muscle, back, or extremity pain  PSYCHIATRIC: No depression, anxiety, mood swings, or difficulty sleeping  HEME/LYMPH: No easy bruising, or bleeding gums      Vital Signs Last 24 Hrs  T(C): 36.4 (2019 10:56), Max: 37 (2019 16:39)  T(F): 97.5 (2019 10:56), Max: 98.6 (2019 16:39)  HR: 65 (2019 10:56) (57 - 88)  BP: 121/55 (2019 10:56) (121/55 - 171/68)  BP(mean): --  RR: 18 (2019 10:56) (14 - 18)  SpO2: 97% (2019 10:56) (97% - 100%)    PHYSICAL EXAM:  GENERAL: NAD, well-developed, well-groomed  HEAD:  Atraumatic, Normocephalic  EYES: conjunctiva and sclera clear  ENMT: Moist mucous membranes  NECK: Supple, No JVD, Normal thyroid  CHEST/LUNG: Clear to Auscultation bilaterally; No rales, rhonchi, wheezing, or rubs  HEART: Regular rate and rhythm; No murmurs, rubs, or gallops  ABDOMEN: Soft, Nontender, Nondistended; Bowel sounds present  EXTREMITIES:  2+ Peripheral Pulses, No clubbing, cyanosis, or edema  SKIN: No rashes or lesions  NERVOUS SYSTEM:  Alert & Oriented X3, Good concentration; Motor Strength 5/5 B/L upper and lower extremities    LABS:                        11.1   4.28  )-----------( 298      ( 2019 20:09 )             35.4     06-02    140  |  103  |  17  ----------------------------<  157<H>  3.9   |  29  |  0.84    Ca    9.1      2019 20:09    TPro  7.2  /  Alb  3.0<L>  /  TBili  0.3  /  DBili  x   /  AST  16  /  ALT  15  /  AlkPhos  129<H>  06-02    PT/INR - ( 2019 20:09 )   PT: 12.1 sec;   INR: 1.08 ratio         PTT - ( 2019 20:09 )  PTT:27.5 sec  Urinalysis Basic - ( 2019 03:15 )    Color: Yellow / Appearance: Clear / S.015 / pH: x  Gluc: x / Ketone: Negative  / Bili: Negative / Urobili: Negative mg/dL   Blood: x / Protein: Negative mg/dL / Nitrite: Negative   Leuk Esterase: Negative / RBC: x / WBC x   Sq Epi: x / Non Sq Epi: x / Bacteria: x      CAPILLARY BLOOD GLUCOSE      POCT Blood Glucose.: 161 mg/dL (2019 16:44)          RADIOLOGY & ADDITIONAL TESTS:          Imaging Personally Reviewed:  [ ] YES  [ ] NO    Consultant(s) Notes Reviewed:  [ ] YES  [ ] NO    Care Discussed with Consultants/Other Providers [x ] YES  [ ] NO

## 2019-06-03 NOTE — PROVIDER CONTACT NOTE (CRITICAL VALUE NOTIFICATION) - TEST AND RESULT REPORTED:
Blood cultures are positive for gram positive cocci in clusters in the aerobic bottle
Troponin 0.140

## 2019-06-04 ENCOUNTER — TRANSCRIPTION ENCOUNTER (OUTPATIENT)
Age: 84
End: 2019-06-04

## 2019-06-04 VITALS — TEMPERATURE: 98 F | RESPIRATION RATE: 18 BRPM | OXYGEN SATURATION: 99 % | HEART RATE: 77 BPM

## 2019-06-04 DIAGNOSIS — R00.1 BRADYCARDIA, UNSPECIFIED: ICD-10-CM

## 2019-06-04 LAB
CHOLEST SERPL-MCNC: 149 MG/DL — SIGNIFICANT CHANGE UP (ref 10–199)
CULTURE RESULTS: SIGNIFICANT CHANGE UP
CULTURE RESULTS: SIGNIFICANT CHANGE UP
GRAM STN FLD: SIGNIFICANT CHANGE UP
HDLC SERPL-MCNC: 69 MG/DL — SIGNIFICANT CHANGE UP
LIPID PNL WITH DIRECT LDL SERPL: 69 MG/DL — SIGNIFICANT CHANGE UP
ORGANISM # SPEC MICROSCOPIC CNT: SIGNIFICANT CHANGE UP
ORGANISM # SPEC MICROSCOPIC CNT: SIGNIFICANT CHANGE UP
SPECIMEN SOURCE: SIGNIFICANT CHANGE UP
SPECIMEN SOURCE: SIGNIFICANT CHANGE UP
T3 SERPL-MCNC: 97 NG/DL — SIGNIFICANT CHANGE UP (ref 80–200)
T4 AB SER-ACNC: 5.1 UG/DL — SIGNIFICANT CHANGE UP (ref 4.6–12)
TOTAL CHOLESTEROL/HDL RATIO MEASUREMENT: 2.2 RATIO — LOW (ref 3.3–7.1)
TRIGL SERPL-MCNC: 53 MG/DL — SIGNIFICANT CHANGE UP (ref 10–149)
TSH SERPL-MCNC: 1.74 UU/ML — SIGNIFICANT CHANGE UP (ref 0.36–3.74)
VIT B12 SERPL-MCNC: 1048 PG/ML — SIGNIFICANT CHANGE UP (ref 232–1245)

## 2019-06-04 PROCEDURE — 99239 HOSP IP/OBS DSCHRG MGMT >30: CPT

## 2019-06-04 RX ORDER — ASPIRIN/CALCIUM CARB/MAGNESIUM 324 MG
1 TABLET ORAL
Qty: 0 | Refills: 0 | DISCHARGE
Start: 2019-06-04

## 2019-06-04 RX ORDER — HYDRALAZINE HCL 50 MG
1 TABLET ORAL
Qty: 90 | Refills: 0
Start: 2019-06-04 | End: 2019-07-03

## 2019-06-04 RX ADMIN — Medication 10 MILLIGRAM(S): at 13:00

## 2019-06-04 RX ADMIN — ENOXAPARIN SODIUM 40 MILLIGRAM(S): 100 INJECTION SUBCUTANEOUS at 13:01

## 2019-06-04 RX ADMIN — Medication 10 MILLIGRAM(S): at 05:29

## 2019-06-04 RX ADMIN — Medication 81 MILLIGRAM(S): at 13:02

## 2019-06-04 NOTE — PHYSICAL THERAPY INITIAL EVALUATION ADULT - DISCHARGE DISPOSITION, PT EVAL
Patient appears in baseline functions on assessment of bed mobility, transfers and gait . No further skilled PT indicated and does not require skilled PT services post-acute care discharge.

## 2019-06-04 NOTE — DIETITIAN INITIAL EVALUATION ADULT. - PERTINENT LABORATORY DATA
06-02 Na 140 mmol/L Glu 157 mg/dL<H> K+ 3.9 mmol/L Cr  0.84 mg/dL BUN 17 mg/dL Phos n/a   Alb 3.0 g/dL<L> PAB n/a   Hgb 11.1 g/dL<L> Hct 35.4 %

## 2019-06-04 NOTE — PHYSICAL THERAPY INITIAL EVALUATION ADULT - PERTINENT HX OF CURRENT PROBLEM, REHAB EVAL
Patient brought to [facility]- ED following syncopal episode at home. Chart reviewed and noted Troponin risen but per medical team. likely leak and not cardiogenic. TTE showing 60% LVEF. CT head ruled out acute change.

## 2019-06-04 NOTE — DIETITIAN INITIAL EVALUATION ADULT. - FACTORS AFF FOOD INTAKE
Pain in both arms, unable to use left arm due to nerve damage./difficulty chewing/difficulty feeding self

## 2019-06-04 NOTE — PHYSICAL THERAPY INITIAL EVALUATION ADULT - GENERAL OBSERVATIONS, REHAB EVAL
Patient supine in bed. Reporting left breast pain by ECG lead. Patient confirms pain is superficial. LUANNE Skelton made aware. Lead reomved by writer, and patient reported pain is much less. Stable vital signs as charted.

## 2019-06-04 NOTE — CHART NOTE - NSCHARTNOTEFT_GEN_A_CORE
Upon Nutritional Assessment by the Registered Dietitian your patient was determined to meet criteria / has evidence of the following diagnosis/diagnoses:          [ ]  Mild Protein Calorie Malnutrition        [ ]  Moderate Protein Calorie Malnutrition        [ x ] Severe Protein Calorie Malnutrition        [ ] Unspecified Protein Calorie Malnutrition        [ x ] Underweight / BMI <19 (BMI 17.2)        [ ] Morbid Obesity / BMI > 40      Findings as based on:  •  Comprehensive nutrition assessment and consultation  •  Calorie counts (nutrient intake analysis)  •  Food acceptance and intake status from observations by staff  •  Follow up  •  Patient education  •  Intervention secondary to interdisciplinary rounds  •   concerns      Treatment:    The following diet has been recommended: Continue: Soft diet. Adding: Ensure Enlive x 3/day (provides 1050 kcal, 60 g protein)       PROVIDER Section:     By signing this assessment you are acknowledging and agree with the diagnosis/diagnoses assigned by the Registered Dietitian    Comments:

## 2019-06-04 NOTE — PHYSICAL THERAPY INITIAL EVALUATION ADULT - ADDITIONAL COMMENTS
Per patient, lives c sister and family in private house c 3 stair steps to enter c handrails. Has rolling walker. Has an aide that assists her with light house work. Denies use of supplemental O2. Used a brace for her left knee but not available at time of assessment.

## 2019-06-04 NOTE — DIETITIAN INITIAL EVALUATION ADULT. - OTHER INFO
Pt seen for BMI <18. Pt lives with sister. Pt and sister share cooking and shopping duties. Pt denied any N/V/D/C last BM 06-03. Pt reported some difficulty chewing but tolerating current soft diet well. No difficulties swallowing.  Pt reported #. Per chart review 120# was 07/2018; prior admission wt 102# (12/2018). Total wt loss x 1 year 21% (26#). Pt reported having difficulty with self feeding with only one arm. Pt stated consuming Boost at home, pt agreed to ensure enlive to supplement nutrition between meals.

## 2019-06-04 NOTE — DIETITIAN INITIAL EVALUATION ADULT. - PERTINENT MEDS FT
MEDICATIONS  (STANDING):  aspirin enteric coated 81 milliGRAM(s) Oral daily  enoxaparin Injectable 40 milliGRAM(s) SubCutaneous every 24 hours  hydrALAZINE 10 milliGRAM(s) Oral every 8 hours    MEDICATIONS  (PRN):  acetaminophen   Tablet .. 650 milliGRAM(s) Oral every 6 hours PRN Mild Pain (1 - 3)  hydrALAZINE Injectable 10 milliGRAM(s) IV Push every 8 hours PRN systolic BP > 160

## 2019-06-04 NOTE — DIETITIAN INITIAL EVALUATION ADULT. - PHYSICAL APPEARANCE
BMI: 17.2; No edema noted; Nutrition focused physical exam conducted:  Subcutaneous fat loss: [ moderate ] Orbital fat pads region, [ edentulous ]Buccal fat region, [ severe ]Triceps region,  [ severe ]Ribs region.  Muscle wasting: [ severe ]Temples region, [ severe ]Clavicle region, [ ]Shoulder region, [ severe ]Scapula region, [severe ]Interosseous region,  [ moderate ]thigh region, [ moderate ]Calf region/overweight

## 2019-06-04 NOTE — DISCHARGE NOTE PROVIDER - HOSPITAL COURSE
87 y/o female w/pmhx of chronic right shoulder dislocation and OA comes in after syncopal event.  Daughter states they were having dinner then she went to answer a call and when called mother no respone and when daughter went to pt she was slumped over and wasnt arousable for almost a minute.  and on coming about she was at baseline.  pt denies any fever, chills, sob, cp, palpitations, n/v/d/c no travels or sick contacts.  pt is currently w/o complains.  she is on no meds, only supplements.        ER course: Head CT: no acute changes, CXR: no acute changes, EKG: NSR @ 62bpm, TWI in leads I, AVL, V4-V5, trop: .140        The patient was admitted to telemetry bed. The patient was initially mildly bradycardic that resolved spontaneously. The patient was further evaluated with Echocardiogram that was benign. No arrithymia was seen on telemonitor. The patient was started on hydralazine for uncontrolled hypertension. PT has evaluated the patient and the patient has no additional needs.

## 2019-06-04 NOTE — DISCHARGE NOTE PROVIDER - NSDCCPCAREPLAN_GEN_ALL_CORE_FT
PRINCIPAL DISCHARGE DIAGNOSIS  Diagnosis: Syncope and collapse  Assessment and Plan of Treatment: Head CT no acute changes,   CXR: no acute changes  EKG: no acute changes.   Most likely Vasovagal hypotension or symptomatic bradycardia.    continue eat at least 3 meals daily and keep yourself well hydrated.   Follow up with PCP in one week.      SECONDARY DISCHARGE DIAGNOSES  Diagnosis: Essential hypertension  Assessment and Plan of Treatment: continue with hydralazine 10mg every 8hours.   Check blood pressure daily, write in log book and bring to PCP.    Diagnosis: Elevated troponin  Assessment and Plan of Treatment: Presumed Demand ischemia.   No acute ST changes on EKG, pt denies any chest pain.    Diagnosis: Sinus bradycardia  Assessment and Plan of Treatment: POA: resolved   follow up with PCP in one week. PRINCIPAL DISCHARGE DIAGNOSIS  Diagnosis: Syncope and collapse  Assessment and Plan of Treatment: Head CT no acute changes,   CXR: no acute changes  EKG: no acute changes.   Most likely Vasovagal hypotension or symptomatic bradycardia.    continue eat at least 3 meals daily and keep yourself well hydrated.   Follow up with PCP in one week.      SECONDARY DISCHARGE DIAGNOSES  Diagnosis: Essential hypertension  Assessment and Plan of Treatment: continue with hydralazine 10mg every 8hours.   Check blood pressure daily, write in log book and bring to PCP.    Diagnosis: Elevated troponin  Assessment and Plan of Treatment: Presumed Demand ischemia.   No acute ST changes on EKG, pt denies any chest pain.    Diagnosis: Sinus bradycardia  Assessment and Plan of Treatment: POA: resolved   follow up with PCP in one week.    Diagnosis: Diastolic CHF, chronic  Assessment and Plan of Treatment: EF: 60-65%, grade 1 diastolic dysfunction   Benign, follow up with PCP in one week.

## 2019-06-04 NOTE — DISCHARGE NOTE NURSING/CASE MANAGEMENT/SOCIAL WORK - NSDCDPATPORTLINK_GEN_ALL_CORE
You can access the [facility] Patient Portal, offered by [facility], by registering with the following website: [email or web address]

## 2019-06-08 LAB
CULTURE RESULTS: SIGNIFICANT CHANGE UP
SPECIMEN SOURCE: SIGNIFICANT CHANGE UP

## 2019-06-10 DIAGNOSIS — R00.1 BRADYCARDIA, UNSPECIFIED: ICD-10-CM

## 2019-06-10 DIAGNOSIS — R55 SYNCOPE AND COLLAPSE: ICD-10-CM

## 2019-06-10 DIAGNOSIS — Z98.42 CATARACT EXTRACTION STATUS, LEFT EYE: ICD-10-CM

## 2019-06-10 DIAGNOSIS — I24.8 OTHER FORMS OF ACUTE ISCHEMIC HEART DISEASE: ICD-10-CM

## 2019-06-10 DIAGNOSIS — M19.90 UNSPECIFIED OSTEOARTHRITIS, UNSPECIFIED SITE: ICD-10-CM

## 2019-06-10 DIAGNOSIS — I95.9 HYPOTENSION, UNSPECIFIED: ICD-10-CM

## 2019-06-10 DIAGNOSIS — I50.32 CHRONIC DIASTOLIC (CONGESTIVE) HEART FAILURE: ICD-10-CM

## 2019-06-10 DIAGNOSIS — I11.0 HYPERTENSIVE HEART DISEASE WITH HEART FAILURE: ICD-10-CM

## 2019-06-10 DIAGNOSIS — E43 UNSPECIFIED SEVERE PROTEIN-CALORIE MALNUTRITION: ICD-10-CM

## 2019-06-10 DIAGNOSIS — Z98.41 CATARACT EXTRACTION STATUS, RIGHT EYE: ICD-10-CM

## 2019-07-03 NOTE — PHYSICAL THERAPY INITIAL EVALUATION ADULT - TRANSFER SKILLS, REHAB EVAL
Final blood culture report is NEGATIVE.    No treatment or change in treatment per [place] ED Lab Result protocol. independent

## 2019-10-02 PROBLEM — Z60.2 PERSON LIVING ALONE: Status: ACTIVE | Noted: 2018-06-27

## 2020-07-02 NOTE — DISCHARGE NOTE PROVIDER - NSCORESITESY/N_GEN_A_CORE_RD
Cancelled patient's lab scheduled for 7-7-2020 in [place]; clinic lab is closed until 7-9-2020.  Please call back to reschedule   Yes

## 2020-08-25 ENCOUNTER — INPATIENT (INPATIENT)
Facility: HOSPITAL | Age: 85
LOS: 2 days | Discharge: NURSING, REHAB OR LONG-TERM CARE | End: 2020-08-28
Attending: INTERNAL MEDICINE | Admitting: INTERNAL MEDICINE
Payer: MEDICARE

## 2020-08-25 VITALS
HEART RATE: 65 BPM | OXYGEN SATURATION: 97 % | HEIGHT: 62 IN | DIASTOLIC BLOOD PRESSURE: 89 MMHG | TEMPERATURE: 97 F | WEIGHT: 100.09 LBS | SYSTOLIC BLOOD PRESSURE: 180 MMHG | RESPIRATION RATE: 17 BRPM

## 2020-08-25 DIAGNOSIS — H26.9 UNSPECIFIED CATARACT: Chronic | ICD-10-CM

## 2020-08-25 LAB
BASOPHILS # BLD AUTO: 0.03 K/UL — SIGNIFICANT CHANGE UP (ref 0–0.2)
BASOPHILS NFR BLD AUTO: 0.4 % — SIGNIFICANT CHANGE UP (ref 0–2)
EOSINOPHIL # BLD AUTO: 0.06 K/UL — SIGNIFICANT CHANGE UP (ref 0–0.5)
EOSINOPHIL NFR BLD AUTO: 0.8 % — SIGNIFICANT CHANGE UP (ref 0–6)
HCT VFR BLD CALC: 39.2 % — SIGNIFICANT CHANGE UP (ref 34.5–45)
HGB BLD-MCNC: 12.6 G/DL — SIGNIFICANT CHANGE UP (ref 11.5–15.5)
IMM GRANULOCYTES NFR BLD AUTO: 0.3 % — SIGNIFICANT CHANGE UP (ref 0–1.5)
LYMPHOCYTES # BLD AUTO: 1.13 K/UL — SIGNIFICANT CHANGE UP (ref 1–3.3)
LYMPHOCYTES # BLD AUTO: 16 % — SIGNIFICANT CHANGE UP (ref 13–44)
MCHC RBC-ENTMCNC: 30.5 PG — SIGNIFICANT CHANGE UP (ref 27–34)
MCHC RBC-ENTMCNC: 32.1 GM/DL — SIGNIFICANT CHANGE UP (ref 32–36)
MCV RBC AUTO: 94.9 FL — SIGNIFICANT CHANGE UP (ref 80–100)
MONOCYTES # BLD AUTO: 0.7 K/UL — SIGNIFICANT CHANGE UP (ref 0–0.9)
MONOCYTES NFR BLD AUTO: 9.9 % — SIGNIFICANT CHANGE UP (ref 2–14)
NEUTROPHILS # BLD AUTO: 5.13 K/UL — SIGNIFICANT CHANGE UP (ref 1.8–7.4)
NEUTROPHILS NFR BLD AUTO: 72.6 % — SIGNIFICANT CHANGE UP (ref 43–77)
NRBC # BLD: 0 /100 WBCS — SIGNIFICANT CHANGE UP (ref 0–0)
PLATELET # BLD AUTO: 266 K/UL — SIGNIFICANT CHANGE UP (ref 150–400)
RBC # BLD: 4.13 M/UL — SIGNIFICANT CHANGE UP (ref 3.8–5.2)
RBC # FLD: 13.6 % — SIGNIFICANT CHANGE UP (ref 10.3–14.5)
WBC # BLD: 7.07 K/UL — SIGNIFICANT CHANGE UP (ref 3.8–10.5)
WBC # FLD AUTO: 7.07 K/UL — SIGNIFICANT CHANGE UP (ref 3.8–10.5)

## 2020-08-25 PROCEDURE — 71045 X-RAY EXAM CHEST 1 VIEW: CPT | Mod: 26

## 2020-08-25 PROCEDURE — 99285 EMERGENCY DEPT VISIT HI MDM: CPT | Mod: CS

## 2020-08-25 NOTE — ED ADULT NURSE NOTE - ED STAT RN HANDOFF DETAILS 3
Report endorsed to hold RN Jaida. Safety checks completed this shift. Safety rounds completed hourly.  IV sites checked Q2+remains WDL. Fall & skin precautions in place. Any issues endorsed to hold RN for follow up. Awaiting bed assignment

## 2020-08-25 NOTE — ED ADULT NURSE NOTE - ED STAT RN HANDOFF DETAILS 2
Report received from LUANNE Campos at 11pm. Assessment available on KB. will continue to monitor. family at bedside

## 2020-08-25 NOTE — ED ADULT TRIAGE NOTE - PAIN RATING/NUMBER SCALE (0-10): REST
Chief Complaint   Patient presents with     Skin Cancer     Lou is here today to have MOHS on the right post auricular for a BCC.      Abigail Bloom, CMA     0

## 2020-08-25 NOTE — ED ADULT NURSE NOTE - OBJECTIVE STATEMENT
patient is 89 y.o lives alone. patient oriented x2 and confused  found by hellen on the floor. last time patient was seen by family was pietro night.

## 2020-08-26 DIAGNOSIS — Z29.9 ENCOUNTER FOR PROPHYLACTIC MEASURES, UNSPECIFIED: ICD-10-CM

## 2020-08-26 DIAGNOSIS — M19.90 UNSPECIFIED OSTEOARTHRITIS, UNSPECIFIED SITE: ICD-10-CM

## 2020-08-26 DIAGNOSIS — I10 ESSENTIAL (PRIMARY) HYPERTENSION: ICD-10-CM

## 2020-08-26 DIAGNOSIS — F03.90 UNSPECIFIED DEMENTIA WITHOUT BEHAVIORAL DISTURBANCE: ICD-10-CM

## 2020-08-26 DIAGNOSIS — M62.82 RHABDOMYOLYSIS: ICD-10-CM

## 2020-08-26 DIAGNOSIS — R41.82 ALTERED MENTAL STATUS, UNSPECIFIED: ICD-10-CM

## 2020-08-26 LAB
ALBUMIN SERPL ELPH-MCNC: 3 G/DL — LOW (ref 3.3–5)
ALP SERPL-CCNC: 124 U/L — HIGH (ref 40–120)
ALT FLD-CCNC: 39 U/L — SIGNIFICANT CHANGE UP (ref 12–78)
ANION GAP SERPL CALC-SCNC: 12 MMOL/L — SIGNIFICANT CHANGE UP (ref 5–17)
APPEARANCE UR: CLEAR — SIGNIFICANT CHANGE UP
AST SERPL-CCNC: 69 U/L — HIGH (ref 15–37)
BACTERIA # UR AUTO: ABNORMAL
BILIRUB SERPL-MCNC: 0.5 MG/DL — SIGNIFICANT CHANGE UP (ref 0.2–1.2)
BILIRUB UR-MCNC: NEGATIVE — SIGNIFICANT CHANGE UP
BUN SERPL-MCNC: 43 MG/DL — HIGH (ref 7–23)
CALCIUM SERPL-MCNC: 9.2 MG/DL — SIGNIFICANT CHANGE UP (ref 8.5–10.1)
CHLORIDE SERPL-SCNC: 107 MMOL/L — SIGNIFICANT CHANGE UP (ref 96–108)
CK SERPL-CCNC: 927 U/L — HIGH (ref 26–192)
CO2 SERPL-SCNC: 22 MMOL/L — SIGNIFICANT CHANGE UP (ref 22–31)
COLOR SPEC: YELLOW — SIGNIFICANT CHANGE UP
CREAT SERPL-MCNC: 1.08 MG/DL — SIGNIFICANT CHANGE UP (ref 0.5–1.3)
DIFF PNL FLD: NEGATIVE — SIGNIFICANT CHANGE UP
EPI CELLS # UR: SIGNIFICANT CHANGE UP
GLUCOSE SERPL-MCNC: 155 MG/DL — HIGH (ref 70–99)
GLUCOSE UR QL: NEGATIVE MG/DL — SIGNIFICANT CHANGE UP
KETONES UR-MCNC: ABNORMAL
LEUKOCYTE ESTERASE UR-ACNC: NEGATIVE — SIGNIFICANT CHANGE UP
NITRITE UR-MCNC: NEGATIVE — SIGNIFICANT CHANGE UP
NT-PROBNP SERPL-SCNC: 268 PG/ML — SIGNIFICANT CHANGE UP (ref 0–450)
PH UR: 5 — SIGNIFICANT CHANGE UP (ref 5–8)
POTASSIUM SERPL-MCNC: 3.6 MMOL/L — SIGNIFICANT CHANGE UP (ref 3.5–5.3)
POTASSIUM SERPL-SCNC: 3.6 MMOL/L — SIGNIFICANT CHANGE UP (ref 3.5–5.3)
PROT SERPL-MCNC: 7.2 GM/DL — SIGNIFICANT CHANGE UP (ref 6–8.3)
PROT UR-MCNC: 15 MG/DL
RBC CASTS # UR COMP ASSIST: NEGATIVE /HPF — SIGNIFICANT CHANGE UP (ref 0–4)
SARS-COV-2 IGG SERPL QL IA: NEGATIVE — SIGNIFICANT CHANGE UP
SARS-COV-2 IGM SERPL IA-ACNC: <0.1 INDEX — SIGNIFICANT CHANGE UP
SODIUM SERPL-SCNC: 141 MMOL/L — SIGNIFICANT CHANGE UP (ref 135–145)
SP GR SPEC: 1.02 — SIGNIFICANT CHANGE UP (ref 1.01–1.02)
UROBILINOGEN FLD QL: NEGATIVE MG/DL — SIGNIFICANT CHANGE UP
WBC UR QL: SIGNIFICANT CHANGE UP

## 2020-08-26 PROCEDURE — 12345: CPT | Mod: NC

## 2020-08-26 PROCEDURE — 70450 CT HEAD/BRAIN W/O DYE: CPT | Mod: 26

## 2020-08-26 PROCEDURE — 72125 CT NECK SPINE W/O DYE: CPT | Mod: 26

## 2020-08-26 PROCEDURE — 93010 ELECTROCARDIOGRAM REPORT: CPT

## 2020-08-26 PROCEDURE — 99222 1ST HOSP IP/OBS MODERATE 55: CPT

## 2020-08-26 PROCEDURE — 72170 X-RAY EXAM OF PELVIS: CPT | Mod: 26

## 2020-08-26 RX ORDER — SODIUM CHLORIDE 9 MG/ML
1000 INJECTION INTRAMUSCULAR; INTRAVENOUS; SUBCUTANEOUS
Refills: 0 | Status: DISCONTINUED | OUTPATIENT
Start: 2020-08-26 | End: 2020-08-27

## 2020-08-26 RX ORDER — HEPARIN SODIUM 5000 [USP'U]/ML
5000 INJECTION INTRAVENOUS; SUBCUTANEOUS EVERY 12 HOURS
Refills: 0 | Status: DISCONTINUED | OUTPATIENT
Start: 2020-08-26 | End: 2020-08-28

## 2020-08-26 RX ORDER — ASPIRIN/CALCIUM CARB/MAGNESIUM 324 MG
81 TABLET ORAL DAILY
Refills: 0 | Status: DISCONTINUED | OUTPATIENT
Start: 2020-08-26 | End: 2020-08-28

## 2020-08-26 RX ORDER — ACETAMINOPHEN 500 MG
650 TABLET ORAL EVERY 6 HOURS
Refills: 0 | Status: DISCONTINUED | OUTPATIENT
Start: 2020-08-26 | End: 2020-08-28

## 2020-08-26 RX ORDER — SODIUM CHLORIDE 9 MG/ML
1000 INJECTION INTRAMUSCULAR; INTRAVENOUS; SUBCUTANEOUS ONCE
Refills: 0 | Status: COMPLETED | OUTPATIENT
Start: 2020-08-26 | End: 2020-08-26

## 2020-08-26 RX ORDER — HYDRALAZINE HCL 50 MG
10 TABLET ORAL EVERY 8 HOURS
Refills: 0 | Status: DISCONTINUED | OUTPATIENT
Start: 2020-08-26 | End: 2020-08-27

## 2020-08-26 RX ADMIN — HEPARIN SODIUM 5000 UNIT(S): 5000 INJECTION INTRAVENOUS; SUBCUTANEOUS at 17:40

## 2020-08-26 RX ADMIN — SODIUM CHLORIDE 80 MILLILITER(S): 9 INJECTION INTRAMUSCULAR; INTRAVENOUS; SUBCUTANEOUS at 16:37

## 2020-08-26 RX ADMIN — SODIUM CHLORIDE 1000 MILLILITER(S): 9 INJECTION INTRAMUSCULAR; INTRAVENOUS; SUBCUTANEOUS at 02:29

## 2020-08-26 RX ADMIN — Medication 10 MILLIGRAM(S): at 22:55

## 2020-08-26 RX ADMIN — Medication 10 MILLIGRAM(S): at 15:14

## 2020-08-26 RX ADMIN — SODIUM CHLORIDE 80 MILLILITER(S): 9 INJECTION INTRAMUSCULAR; INTRAVENOUS; SUBCUTANEOUS at 04:42

## 2020-08-26 RX ADMIN — Medication 10 MILLIGRAM(S): at 05:59

## 2020-08-26 RX ADMIN — HEPARIN SODIUM 5000 UNIT(S): 5000 INJECTION INTRAVENOUS; SUBCUTANEOUS at 05:59

## 2020-08-26 NOTE — PHYSICAL THERAPY INITIAL EVALUATION ADULT - PERTINENT HX OF CURRENT PROBLEM, REHAB EVAL
This is the hx of A. P. a 88 y/o female patient who was admitted to [facility] due to complications of Non Traumatic Rhabdomyolysis, unsteady gait affecting medical condition and with subsequent affection on functional mobility.

## 2020-08-26 NOTE — PHYSICAL THERAPY INITIAL EVALUATION ADULT - DID THE PATIENT HAVE SURGERY?
n/a/This is the hx of A. P. a 89 y/o female patient who was admitted to [facility] due to complications of Non Traumatic Rhabdomyolysis, unsteady gait affecting medical condition and with subsequent affection on functional mobility.

## 2020-08-26 NOTE — SWALLOW BEDSIDE ASSESSMENT ADULT - H & P REVIEW
yes/Patient is an 89F with OA and dementia who presents to the ED  for AMS.  Patient currently nonverbal, unable to provide history.  Patient was reportedly found on the floor at home for an unknown time (presumably two days?)  Patient reportedly lives alone.  Unknown baseline functional status.  Per EMS report, patient was noted to have slurred speech, facial droop, and poor strength in the R arm.  Hypertensive in ED, improved with medications.  Labs show mildly elevated CK. CT head negative.  Will admit to tele.

## 2020-08-26 NOTE — ED PROVIDER NOTE - OBJECTIVE STATEMENT
Pt is a 88 yo lady with a pmhx of dementia who presents to the ED with weakness and fall. She lives alone, and brother in law saw her Sunday, and then family could not reach her until today, where she was found on the floor with food uneaten. Pt says she tried to sit in the chair and slid down, and was too weak to get back up. Denies any pain currently. No cough, no fevers.

## 2020-08-26 NOTE — H&P ADULT - ASSESSMENT
Patient is an 89F with OA and dementia who presents to the ED  for AMS.  Patient currently nonverbal, unable to provide history.  Patient was reportedly found on the floor at home for an unknown time (presumably two days?)  Patient reportedly lives alone.  Unknown baseline functional status.  Per EMS report, patient was noted to have slurred speech, facial droop, and poor strength in the R arm.  Hypertensive in ED, improved with medications.  Labs show mildly elevated CK. CT head negative.  Will admit to tele.      IMPROVE VTE Individual Risk Assessment          RISK                                                          Points  [  ] Previous VTE                                                3  [  ] Thrombophilia                                             2  [  ] Lower limb paralysis                                    2        (unable to hold up >15 seconds)    [  ] Current Cancer                                             2         (within 6 months)  [  ] Immobilization > 24 hrs                              1  [  ] ICU/CCU stay > 24 hours                            1  [  ] Age > 60                                                    1    IMPROVE VTE Score - 1

## 2020-08-26 NOTE — SWALLOW BEDSIDE ASSESSMENT ADULT - SLP GENERAL OBSERVATIONS
pt seen bedside alert and oriented x3-4. pt engaged in conversation for assessment with fair to good accuracy, she verbalized needs and was able to follow one step directions.

## 2020-08-26 NOTE — CONSULT NOTE ADULT - SUBJECTIVE AND OBJECTIVE BOX
NEUROLOGY CONSULT    HPI:  88 yo woman who lives alone and reportedly had a fall at home and was found on the floor by her family with slurred speech, right arm weakness, and left facial droop.  She reports a longstanding history of right arm weakness due to right shoulder pain and arthritis for which she has had physical therapy.  She denies any new weakness or any new deficits, and feels her speech is normal.  She feels well now, and says she has not needed to take any medication her entire life for any medical conditions, although her BP has been elevated as recorded here (180/89 in ER).  She feels at her baseline now.  She ambulates with a walker at home.    Labs:  WBC = 7.07  BUN/Cr = 43/1.08  Glu = 155  Alb = 3.0  CPK = 927  UA - negative    Head CT - no acute intracranial pathology, global atrophy    NEUROLOGICAL EXAM:  T 97.0 F  /63  HR 63  MS: Awake, alert, oriented x 4, language fluent, comprehension intact, naming intact, repetition intact, normal affect  CN: PERRLA, EOMI, visual fields intact, facial sensation intact, +left central facial paresis with flattened left NL fold, hearing intact, no dysarthria, symmetric palatal elevation, tongue midline, symmetric shoulder shrug and SCM strength  Motor: normal bulk, normal tone, power in RUE limited by pain proximally and right shoulder stiffness 4/5 proximally, 5/5 in LUE/RLL/LLE, no tremors or fasciculations  Sensation: intact to light touch  Reflexes: 2+ at biceps, brachioradialis, patella, ankle bilaterally.  Flexor plantar response bilaterally.  No clonus  Coordination: no dysmetria    Assessment:  88 yo woman s/p fall with rhabdomyolysis.  Her right arm weakness appears in part related to her chronic right shoulder pain and arthritis.   She does have a mild left central facial paresis.      Recommendations:  1. MRI brain pending to evaluate for acute infarct  2. Antiplatelet therapy: Aspirin 81mg daily  3. Further work up depending on MRI result  4. PT/OT     Manjinder Lopez MD  [facility] Department of Neurology  [facility] NEUROLOGY CONSULT    HPI:  89 yo woman who lives alone and reportedly had a fall at home and was found on the floor by her family with slurred speech, right arm weakness, and left facial droop.  She reports a longstanding history of right arm weakness due to right shoulder pain and arthritis for which she has had physical therapy.  She denies any new weakness or any new deficits, and feels her speech is normal.  She feels well now, and says she has not needed to take any medication her entire life for any medical conditions, although her BP has been elevated as recorded here (180/89 in ER).  She feels at her baseline now.  She ambulates with a walker at home.    Although her chart lists "dementia", today she is oriented to person/place/time and situation.    Labs:  WBC = 7.07  BUN/Cr = 43/1.08  Glu = 155  Alb = 3.0  CPK = 927  UA - negative    Head CT - no acute intracranial pathology, global atrophy    NEUROLOGICAL EXAM:  T 97.0 F  /63  HR 63  MS: Awake, alert, oriented x 4, language fluent, comprehension intact, naming intact, repetition intact, normal affect  CN: PERRLA, EOMI, visual fields intact, facial sensation intact, +left central facial paresis with flattened left NL fold, hearing intact, no dysarthria, symmetric palatal elevation, tongue midline, symmetric shoulder shrug and SCM strength  Motor: normal bulk, normal tone, power in RUE limited by pain proximally and right shoulder stiffness 4/5 proximally, 5/5 in LUE/RLL/LLE, no tremors or fasciculations  Sensation: intact to light touch  Reflexes: 2+ at biceps, brachioradialis, patella, ankle bilaterally.  Flexor plantar response bilaterally.  No clonus  Coordination: no dysmetria    Assessment:  89 yo woman s/p fall with rhabdomyolysis.  Her right arm weakness appears in part related to her chronic right shoulder pain and arthritis.   She does have a mild left central facial paresis.      Recommendations:  1. MRI brain pending to evaluate for acute infarct  2. Antiplatelet therapy: Aspirin 81mg daily  3. Further work up depending on MRI result  4. PT/OT   5. Patient may have cognitive impairment, although formal neuropsych eval may be helpful as outpatient    Manjinder Lopez MD  [facility] Department of Neurology  [facility]

## 2020-08-26 NOTE — SWALLOW BEDSIDE ASSESSMENT ADULT - ORAL PREPARATORY PHASE
Lateral loss of bolus/intermittent- trace to mild. pt with good awareness/Anterior loss of bolus/Reduced oral grading Reduced oral grading slightly increased mastication time. pt with good awareness/Decreased mastication ability

## 2020-08-26 NOTE — PHYSICAL THERAPY INITIAL EVALUATION ADULT - STRENGTHENING, PT EVAL
Improve strength in the B LE/B UE 4/5 and be able to perform functional tasks-bed mobility, sitting, standing, transfers and ambulate in a safe manner with or without  assistive device and prevent falls.

## 2020-08-26 NOTE — H&P ADULT - NSHPLABSRESULTS_GEN_ALL_CORE
Recent Vitals  T(C): 36.5 (20 @ 03:14), Max: 37.3 (20 @ 01:07)  HR: 76 (20 @ 03:14) (64 - 76)  BP: 160/86 (20 @ 03:14) (142/59 - 180/89)  RR: 18 (20 @ 03:14) (13 - 18)  SpO2: 100% (20 @ 03:14) (97% - 100%)                        12.6   7.07  )-----------( 266      ( 25 Aug 2020 23:23 )             39.2         141  |  107  |  43<H>  ----------------------------<  155<H>  3.6   |  22  |  1.08    Ca    9.2      26 Aug 2020 01:05    TPro  7.2  /  Alb  3.0<L>  /  TBili  0.5  /  DBili  x   /  AST  69<H>  /  ALT  39  /  AlkPhos  124<H>        LIVER FUNCTIONS - ( 26 Aug 2020 01:05 )  Alb: 3.0 g/dL / Pro: 7.2 gm/dL / ALK PHOS: 124 U/L / ALT: 39 U/L / AST: 69 U/L / GGT: x           Urinalysis Basic - ( 26 Aug 2020 02:41 )    Color: Yellow / Appearance: Clear / S.020 / pH: x  Gluc: x / Ketone: Small  / Bili: Negative / Urobili: Negative mg/dL   Blood: x / Protein: 15 mg/dL / Nitrite: Negative   Leuk Esterase: Negative / RBC: x / WBC x   Sq Epi: x / Non Sq Epi: x / Bacteria: x        Home Medications:  aspirin 81 mg oral delayed release tablet: 1 tab(s) orally once a day (2019 16:47)

## 2020-08-26 NOTE — SWALLOW BEDSIDE ASSESSMENT ADULT - ASR SWALLOW DENTITION
incomplete/pt with recent visit to the dentist. no upper teeth and has 4-5 lower midline teeth. malocclusion

## 2020-08-26 NOTE — H&P ADULT - NSHPPHYSICALEXAM_GEN_ALL_CORE
Physical exam:  General: elderly female in no acute distress,  Head:  Atraumatic, Normocephalic  Eyes: EOMI, PERRLA, clear sclera  Neck: Supple, thyroid nontender, non enlarged  Cardio: S1/S2 +ve, regular rate and rhythm, no M/G/R  Resp: clear to ausculation bilaterally, no rales or wheezes  GI: abdomen soft, nontender, non distended, no guarding, BS +ve x 4  Ext: no significant pedal edema  Neuro: unresponsive to commands   Skin: No rashes or lesions

## 2020-08-26 NOTE — PHYSICAL THERAPY INITIAL EVALUATION ADULT - ADDITIONAL COMMENTS
as per niece pt lives by herself on a PH with 5 stairs to enter with R HR, inside there 10 more steps with 1 HR. pt has a walker, she has been independent but with decreased balance. pt does not have an aide, family checks on her but she seems to be deferring aide services due to financial expenses. pt owns a rolling walker, w/c and transport chair. has a knee brace for the L knee.

## 2020-08-26 NOTE — CHART NOTE - NSCHARTNOTEFT_GEN_A_CORE
Pt seen/ examined at bed side. admitted by nocturnist over night.  pt is alert/ oriented, gives hx chronci right shoulder dislocation but no surgery was done due to advanced age.    89 y/o F with PMH of  OA and dementia brought in for AMS.  Patient was nonverbal in ED.  Patient was reportedly found on the floor at home for an unknown time   Patient reportedly lives alone.  Per EMS report, patient was noted to have slurred speech, facial droop, and poor strength in the R arm.  Hypertensive in ED, improved with medications.  Labs show mildly elevated CK. CT head negative.    Pt is alert/ oriented this am,  speech little slurry but she is edentulous. has generalized weakness.  she states right arm is weak due to right shoulder dislocation.  Pt mentions she was on the floor for few hours.    MRI brain pending  Neuro eval/ Dr. Lopez    swallow eval noted > mechanical soft/ thin liquids    c/w IVF, fu CPK in am.    chronic dislocation of right shoulder  analgesics       Sq heparin for dvt ppx      PT eval    called family phone #, sister phone not answered and  is [place] and confused, unable to understand Rx plan.

## 2020-08-26 NOTE — ED PROVIDER NOTE - CONSTITUTIONAL, MLM
[facility]- Pediatric Dermatology  Dr. Brianna Pineda, Dr. Terese Stack, Dr. Gina Merritt, GENO Velazquez Dr., Dr. Kaelyn Marques & Dr. Raheel Mejia       Non Urgent  Nurse Triage Line; [phone]- [facility] and Mey DE LEON Care Coordinators      [facility] Pediatric Dermatology Specialty - [phone]      If you need a prescription refill, please contact your pharmacy. Refills are approved or denied by our Physicians during normal business hours, Monday through Fridays    Per office policy, refills will not be granted if you have not been seen within the past year (or sooner depending on your child's condition)      Scheduling Information:     Pediatric Appointment Scheduling and Call Center [phone]   Radiology Scheduling- [phone]     Sedation Unit Scheduling- [phone]    [place] Scheduling- [facility] [phone]; Pediatric Dermatology [phone]    Main  Services: [phone]   [place]ese: [phone]   Maldivian: [phone]   Hmong/Yi/Serbian: [phone]      Preadmission Nursing Department Fax Number: [phone] (Fax all pre-operative paperwork to this number)      For urgent matters arising during evenings, weekends, or holidays that cannot wait for normal business hours please call [phone] and ask for the Dermatology Resident On-Call to be paged.      Pediatric Dermatology  [address] [address]  [phone]    Gentle Skin Care    Below is a list of products our providers recommend for gentle skin care.  Moisturizers:    Lighter; Exederm Intensive Moisture Cream, Cetaphil Cream, CeraVe, Aveeno Positively radiant and Vanicream Light     Thicker; Aquaphor Ointment, Vaseline, Petroleum Jelly, Eucerin Original Healing Cream and Vanicream, CeraVe Healing Ointment, Aquaphor Body Spray    Avoid Lotions (too thin)  Mild Cleansers:    Dove- Fragrance Free bar or wash    CeraVe      Vanicream Cleansing bar    Cetaphil Cleanser     Aquaphor 2 in1 Gentle Wash and Shampoo    Dove Baby wash    Exederm Body wash       Laundry Products:      All Free and Clear    Cheer Free    Generic Brands are okay as long as they are  Fragrance Free      Avoid fabric softeners  and dryer sheets   Sunscreens: SPF 30 or greater       Sunscreens that contain Zinc Oxide and/or Titanium Dioxide should be applied, these are physical blockers. One or both of these should be listed in the  Active Ingredients     Any other listed ingredients under the active ingredients would be a chemically based sunscreen which might be irritating.    Spray sunscreens should be avoided because these are typically chemical sunscreens.      Shampoo and Conditioners:    Free and Clear by Vanicream    Aquaphor 2 in 1 Gentle Wash and Shampoo   Oils:    Mineral Oil     Emu Oil     For some patients: Coconut (raw, unrefined, organic) and Sunflower seed oil              Generic Products are an okay substitute, but make sure they are fragrance free.  *Reading the product ingredients list is very important  *Avoid product that have fragrance added to them.   *Organic does not mean  fragrance free.  In fact patients with sensitive skin can become quite irritated by some organic products.     1. Daily bathing is recommended. Make sure you are applying a good moisturizer after bathing every time.  2. Use Moisturizing creams at least twice daily to the whole body. Your provider may recommend a lighter or heavier moisturizer based on your child s severity and that time of year it is.  3. Creams are more moisturizing than lotions.       Care Plan:  1. Keep bathing and showering short, less than 15 minutes   2. Always use lukewarm warm when possible. AVOID HOT or COLD water  3. DO NOT use bubble bath  4. Limit the use of soaps. Focus on the skin folds, face, armpits, groin and feet towards the end of the bath  5. Do NOT vigorously scrub when you  cleanse the skin  6. After bathing, PAT your skin lightly with a towel. DO NOT rub or scrub when drying  7. ALWAYS apply a moisturizer immediately after bathing. This helps to  lock in  the moisture. * IF YOU WERE PRESCRIBED A TOPICAL MEDICATION, APPLY YOUR MEDICATION FIRST THEN COVER WITH YOUR DAILY MOISTURIZER  8. Reapply moisturizing agents at least twice daily to your whole body    Other helpful tips:    Do not use products such as powders, perfumes, or colognes on your skin    Diffusers can be harsh on sensitive skin, use with caution if you or your child has sensitive skin     Avoid saunas and steam baths. This temperature is too HOT    Avoid tight or  scratchy  clothing such as wool    Always wash new clothing before wearing them for the first time    Sometimes a humidifier or vaporizer can be used at night can help the dry skin. Remember to keep these items clean to avoid mold growth.      We saw Annmarie today for her hypopigmented spot on her face. 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 We discussed that this is probably not vitiligo and linear morphea, but we will see her back in 4 months ago to reevaluate this spot.    The other red bumps she has on her cheeks, arms, and legs are called keratosis pilaris. This may resolve as she gets older. We recommend to use gentle skin care with mild soap when bathing her and to moisturizer her. Avoid exfoliating and bubble baths.    - - -

## 2020-08-26 NOTE — PHYSICAL THERAPY INITIAL EVALUATION ADULT - CRITERIA FOR SKILLED THERAPEUTIC INTERVENTIONS
predicted duration of therapy intervention/anticipated discharge recommendation/risk reduction/prevention/rehab potential/subacute Rehab/impairments found/therapy frequency/functional limitations in following categories

## 2020-08-26 NOTE — H&P ADULT - HISTORY OF PRESENT ILLNESS
Patient is an 89F with OA and dementia who presents to the ED  for AMS.  Patient currently nonverbal, unable to provide history.  Patient was reportedly found on the floor at home for an unknown time (presumably two days?)  Patient reportedly lives alone.  Unknown baseline functional status.  Per EMS report, patient was noted to have slurred speech, facial droop, and poor strength in the R arm.  Hypertensive in ED, improved with medications.  Labs show mildly elevated CK. CT head negative.  Will admit to tele.

## 2020-08-26 NOTE — PHYSICAL THERAPY INITIAL EVALUATION ADULT - BALANCE TRAINING, PT EVAL
Pt will increase static/dynamic standing balance to WFL to perform all functional mobility without LOB, by 2-4 weeks

## 2020-08-26 NOTE — PHYSICAL THERAPY INITIAL EVALUATION ADULT - BED MOBILITY TRAINING, PT EVAL
Pt will independently perform all aspects of bed mobility to help prevent pressure ulcers, by 3-4 weeks no

## 2020-08-26 NOTE — SWALLOW BEDSIDE ASSESSMENT ADULT - SWALLOW EVAL: DIAGNOSIS
oropharyngeal phases of swallow grossly WNL except intermittent anterior/right lateral loss may be 2/2 malocclusion and slightly increased mastication time 2/2 incomplete dentition

## 2020-08-26 NOTE — ED PROVIDER NOTE - CLINICAL SUMMARY MEDICAL DECISION MAKING FREE TEXT BOX
Ddx: Rhabdomyolysis/ ro ICH/ fractures/ Weakness/ infection  Plan: Cbc, cmp, ct head, cxr, pelvis, xray, cpk, fluids, admit

## 2020-08-26 NOTE — SWALLOW BEDSIDE ASSESSMENT ADULT - ORAL PHASE
Decreased anterior-posterior movement of the bolus Stasis in anterior sulcus/Stasis in lateral sulci Within functional limits

## 2020-08-26 NOTE — SWALLOW BEDSIDE ASSESSMENT ADULT - COMMENTS
CXR 8/25/2020IMPRESSION:Clear lungs.  Chronic appearing anterior dislocation of the right shoulder, similar in appearance to prior. Correlate clinically.    CT head no contrast 8/26/2020 IMPRESSION:Head CT: Stable exam. No acute intracranial hemorrhage or calvarial fracture.

## 2020-08-26 NOTE — SWALLOW BEDSIDE ASSESSMENT ADULT - SWALLOW EVAL: RECOMMENDED FEEDING/EATING TECHNIQUES
small sips/bites/check mouth frequently for oral residue/pocketing/maintain upright posture during/after eating for 30 mins/oral hygiene

## 2020-08-27 LAB
A1C WITH ESTIMATED AVERAGE GLUCOSE RESULT: 5.3 % — SIGNIFICANT CHANGE UP (ref 4–5.6)
CHOLEST SERPL-MCNC: 143 MG/DL — SIGNIFICANT CHANGE UP (ref 10–199)
CULTURE RESULTS: NO GROWTH — SIGNIFICANT CHANGE UP
ESTIMATED AVERAGE GLUCOSE: 105 MG/DL — SIGNIFICANT CHANGE UP (ref 68–114)
HDLC SERPL-MCNC: 72 MG/DL — SIGNIFICANT CHANGE UP
LIPID PNL WITH DIRECT LDL SERPL: 61 MG/DL — SIGNIFICANT CHANGE UP
SARS-COV-2 RNA SPEC QL NAA+PROBE: SIGNIFICANT CHANGE UP
SPECIMEN SOURCE: SIGNIFICANT CHANGE UP
TOTAL CHOLESTEROL/HDL RATIO MEASUREMENT: 2 RATIO — LOW (ref 3.3–7.1)
TRIGL SERPL-MCNC: 51 MG/DL — SIGNIFICANT CHANGE UP (ref 10–149)

## 2020-08-27 PROCEDURE — 70551 MRI BRAIN STEM W/O DYE: CPT | Mod: 26

## 2020-08-27 PROCEDURE — 99232 SBSQ HOSP IP/OBS MODERATE 35: CPT

## 2020-08-27 RX ORDER — OXYCODONE AND ACETAMINOPHEN 5; 325 MG/1; MG/1
1 TABLET ORAL EVERY 4 HOURS
Refills: 0 | Status: DISCONTINUED | OUTPATIENT
Start: 2020-08-27 | End: 2020-08-28

## 2020-08-27 RX ORDER — HYDRALAZINE HCL 50 MG
25 TABLET ORAL EVERY 8 HOURS
Refills: 0 | Status: DISCONTINUED | OUTPATIENT
Start: 2020-08-27 | End: 2020-08-28

## 2020-08-27 RX ORDER — POTASSIUM CHLORIDE 20 MEQ
40 PACKET (EA) ORAL ONCE
Refills: 0 | Status: COMPLETED | OUTPATIENT
Start: 2020-08-27 | End: 2020-08-27

## 2020-08-27 RX ORDER — POTASSIUM CHLORIDE 20 MEQ
40 PACKET (EA) ORAL ONCE
Refills: 0 | Status: DISCONTINUED | OUTPATIENT
Start: 2020-08-27 | End: 2020-08-27

## 2020-08-27 RX ORDER — POTASSIUM CHLORIDE 20 MEQ
20 PACKET (EA) ORAL
Refills: 0 | Status: DISCONTINUED | OUTPATIENT
Start: 2020-08-27 | End: 2020-08-27

## 2020-08-27 RX ORDER — ONDANSETRON 8 MG/1
4 TABLET, FILM COATED ORAL EVERY 6 HOURS
Refills: 0 | Status: DISCONTINUED | OUTPATIENT
Start: 2020-08-27 | End: 2020-08-28

## 2020-08-27 RX ADMIN — Medication 40 MILLIEQUIVALENT(S): at 12:00

## 2020-08-27 RX ADMIN — HEPARIN SODIUM 5000 UNIT(S): 5000 INJECTION INTRAVENOUS; SUBCUTANEOUS at 17:31

## 2020-08-27 RX ADMIN — OXYCODONE AND ACETAMINOPHEN 1 TABLET(S): 5; 325 TABLET ORAL at 13:00

## 2020-08-27 RX ADMIN — HEPARIN SODIUM 5000 UNIT(S): 5000 INJECTION INTRAVENOUS; SUBCUTANEOUS at 05:25

## 2020-08-27 RX ADMIN — Medication 10 MILLIGRAM(S): at 05:25

## 2020-08-27 RX ADMIN — SODIUM CHLORIDE 80 MILLILITER(S): 9 INJECTION INTRAMUSCULAR; INTRAVENOUS; SUBCUTANEOUS at 05:24

## 2020-08-27 RX ADMIN — OXYCODONE AND ACETAMINOPHEN 1 TABLET(S): 5; 325 TABLET ORAL at 12:00

## 2020-08-27 RX ADMIN — Medication 81 MILLIGRAM(S): at 11:09

## 2020-08-27 RX ADMIN — Medication 25 MILLIGRAM(S): at 21:35

## 2020-08-27 NOTE — PROGRESS NOTE ADULT - SUBJECTIVE AND OBJECTIVE BOX
CHIEF COMPLAINT/INTERVAL HISTORY:    Patient is a 89y old  Female who presents with a chief complaint of ams, rhabdo (26 Aug 2020 17:08)      HPI:  Patient is an 89F with OA and dementia who presents to the ED  for AMS.  Patient currently nonverbal, unable to provide history.  Patient was reportedly found on the floor at home for an unknown time (presumably two days?)  Patient reportedly lives alone.  Unknown baseline functional status.  Per EMS report, patient was noted to have slurred speech, facial droop, and poor strength in the R arm.  Hypertensive in ED, improved with medications.  Labs show mildly elevated CK. CT head negative.  Will admit to tele. (26 Aug 2020 03:16)      SUBJECTIVE & OBJECTIVE: Pt seen and examined at bedside.   c/o nausea  denies CP/ SOB/ abd pain, constipation or diarrhea.    Vital Signs Last 24 Hrs  T(C): 36.6 (27 Aug 2020 11:03), Max: 37.2 (27 Aug 2020 00:00)  T(F): 97.8 (27 Aug 2020 11:), Max: 98.9 (27 Aug 2020 00:00)  HR: 70 (27 Aug 2020 11:03) (61 - 75)  BP: 160/92 (27 Aug 2020 11:03) (147/80 - 168/75)  BP(mean): --  ABP: --  ABP(mean): --  RR: 18 (27 Aug 2020 11:03) (18 - 20)  SpO2: 96% (27 Aug 2020 11:03) (96% - 100%)        MEDICATIONS  (STANDING):  aspirin enteric coated 81 milliGRAM(s) Oral daily  heparin   Injectable 5000 Unit(s) SubCutaneous every 12 hours  hydrALAZINE 10 milliGRAM(s) Oral every 8 hours  sodium chloride 0.9%. 1000 milliLiter(s) (80 mL/Hr) IV Continuous <Continuous>    MEDICATIONS  (PRN):  acetaminophen   Tablet .. 650 milliGRAM(s) Oral every 6 hours PRN Temp greater or equal to 38C (100.4F), Moderate Pain (4 - 6)  ondansetron Injectable 4 milliGRAM(s) IV Push every 6 hours PRN Nausea and/or Vomiting  oxycodone    5 mG/acetaminophen 325 mG 1 Tablet(s) Oral every 4 hours PRN Severe Pain (7 - 10)        PHYSICAL EXAM:    GENERAL: NAD, malnoursihed  HEAD:  Atraumatic, Normocephalic  EYES: EOMI, PERRLA,   ENMT: Moist mucous membranes  NECK: Supple  NERVOUS SYSTEM:  Alert/ awake, interactive, follows commands, neuro exam non focal, except + mild left central facial weakness   CHEST/LUNG: Clear to auscultation bilaterally; No rales, rhonchi, wheezing, or rubs  HEART: S1, s2  ABDOMEN: Soft, Nontender, Nondistended; Bowel sounds present  EXTREMITIES:  no cyanosis, or edema    LABS:                        12.6   7.07  )-----------( 266      ( 25 Aug 2020 23:23 )             39.2     08-    142  |  107  |  17  ----------------------------<  93  3.2<L>   |  27  |  0.58    Ca    8.4<L>      27 Aug 2020 07:26    TPro  7.2  /  Alb  3.0<L>  /  TBili  0.5  /  DBili  x   /  AST  69<H>  /  ALT  39  /  AlkPhos  124<H>  08-26      Urinalysis Basic - ( 26 Aug 2020 02:41 )    Color: Yellow / Appearance: Clear / S.020 / pH: x  Gluc: x / Ketone: Small  / Bili: Negative / Urobili: Negative mg/dL   Blood: x / Protein: 15 mg/dL / Nitrite: Negative   Leuk Esterase: Negative / RBC: Negative /HPF / WBC 0-2   Sq Epi: x / Non Sq Epi: Occasional / Bacteria: Occasional

## 2020-08-27 NOTE — PROGRESS NOTE ADULT - ASSESSMENT
89 y/o F with PMH of  OA and dementia brought in for AMS.  Patient was nonverbal in ED.  Patient was reportedly found on the floor at home for an unknown time   Patient reportedly lives alone.  Per EMS report, patient was noted to have slurred speech, facial droop, and poor strength in the R arm.  Hypertensive in ED, improved with medications.  Labs show mildly elevated CK. CT head negative.    Pt is back to baseline, speech little slurry but she is edentulous, probably due to that, also she has  generalized weakness.  she states right arm is weak due to chronic right shoulder dislocation. never had surgery as she was told , not a surgical candidate due to age.  Pt mentions she was on the floor for few hours.    r/o stroke  Neuro eval/ Dr. Lopez appreciated  MRI brain neg for acute stroke  ASA  swallow eval noted > mechanical soft/ thin liquids  PT eval noted > recommend dc dispo : CL[facility] [place] [facility] [place] [facility]    Rhabdomyolysis  CPK improving  dc iVf as BP very high and CPK much improved.  encourage Po fluids    chronic dislocation of right shoulder  analgesics     essential HTN  Hydralazine, increase dose to optimize bp control.       Sq heparin for dvt ppx      called family phone #, sister phone not answered and  is [place] and ? confused, he was unable to understand Rx plan.

## 2020-08-28 ENCOUNTER — TRANSCRIPTION ENCOUNTER (OUTPATIENT)
Age: 85
End: 2020-08-28

## 2020-08-28 VITALS
SYSTOLIC BLOOD PRESSURE: 147 MMHG | HEART RATE: 61 BPM | OXYGEN SATURATION: 100 % | RESPIRATION RATE: 16 BRPM | TEMPERATURE: 98 F | DIASTOLIC BLOOD PRESSURE: 68 MMHG

## 2020-08-28 LAB
ANION GAP SERPL CALC-SCNC: 7 MMOL/L — SIGNIFICANT CHANGE UP (ref 5–17)
BUN SERPL-MCNC: 13 MG/DL — SIGNIFICANT CHANGE UP (ref 7–23)
CALCIUM SERPL-MCNC: 8.6 MG/DL — SIGNIFICANT CHANGE UP (ref 8.5–10.1)
CHLORIDE SERPL-SCNC: 106 MMOL/L — SIGNIFICANT CHANGE UP (ref 96–108)
CK SERPL-CCNC: 144 U/L — SIGNIFICANT CHANGE UP (ref 26–192)
CO2 SERPL-SCNC: 25 MMOL/L — SIGNIFICANT CHANGE UP (ref 22–31)
CREAT SERPL-MCNC: 0.58 MG/DL — SIGNIFICANT CHANGE UP (ref 0.5–1.3)
GLUCOSE SERPL-MCNC: 85 MG/DL — SIGNIFICANT CHANGE UP (ref 70–99)
HCT VFR BLD CALC: 36.8 % — SIGNIFICANT CHANGE UP (ref 34.5–45)
HGB BLD-MCNC: 11.6 G/DL — SIGNIFICANT CHANGE UP (ref 11.5–15.5)
MCHC RBC-ENTMCNC: 30.7 PG — SIGNIFICANT CHANGE UP (ref 27–34)
MCHC RBC-ENTMCNC: 31.5 GM/DL — LOW (ref 32–36)
MCV RBC AUTO: 97.4 FL — SIGNIFICANT CHANGE UP (ref 80–100)
NRBC # BLD: 0 /100 WBCS — SIGNIFICANT CHANGE UP (ref 0–0)
PLATELET # BLD AUTO: 227 K/UL — SIGNIFICANT CHANGE UP (ref 150–400)
POTASSIUM SERPL-MCNC: 3.5 MMOL/L — SIGNIFICANT CHANGE UP (ref 3.5–5.3)
POTASSIUM SERPL-SCNC: 3.5 MMOL/L — SIGNIFICANT CHANGE UP (ref 3.5–5.3)
RBC # BLD: 3.78 M/UL — LOW (ref 3.8–5.2)
RBC # FLD: 14.2 % — SIGNIFICANT CHANGE UP (ref 10.3–14.5)
SODIUM SERPL-SCNC: 138 MMOL/L — SIGNIFICANT CHANGE UP (ref 135–145)
WBC # BLD: 7.05 K/UL — SIGNIFICANT CHANGE UP (ref 3.8–10.5)
WBC # FLD AUTO: 7.05 K/UL — SIGNIFICANT CHANGE UP (ref 3.8–10.5)

## 2020-08-28 PROCEDURE — 73030 X-RAY EXAM OF SHOULDER: CPT | Mod: 26,50

## 2020-08-28 PROCEDURE — 71111 X-RAY EXAM RIBS/CHEST4/> VWS: CPT | Mod: 26

## 2020-08-28 PROCEDURE — 99239 HOSP IP/OBS DSCHRG MGMT >30: CPT

## 2020-08-28 RX ORDER — ACETAMINOPHEN 500 MG
2 TABLET ORAL
Qty: 0 | Refills: 0 | DISCHARGE
Start: 2020-08-28

## 2020-08-28 RX ORDER — HYDRALAZINE HCL 50 MG
1 TABLET ORAL
Qty: 0 | Refills: 0 | DISCHARGE
Start: 2020-08-28

## 2020-08-28 RX ADMIN — HEPARIN SODIUM 5000 UNIT(S): 5000 INJECTION INTRAVENOUS; SUBCUTANEOUS at 06:17

## 2020-08-28 RX ADMIN — Medication 25 MILLIGRAM(S): at 06:17

## 2020-08-28 RX ADMIN — Medication 25 MILLIGRAM(S): at 13:57

## 2020-08-28 RX ADMIN — Medication 81 MILLIGRAM(S): at 13:56

## 2020-08-28 NOTE — DIETITIAN INITIAL EVALUATION ADULT. - ENERGY NEEDS
Height (cm): 154.9 (08-26)  Weight (kg): 44.5 (08-28)  BMI (kg/m2): 18.5 (08-28)  IBW: 47.7 kg +/- 10%    % IBW:   93%   UBW:   stable    %UBW: 100%

## 2020-08-28 NOTE — DIETITIAN INITIAL EVALUATION ADULT. - PHYSICAL APPEARANCE
underweight/emaciated/debilitated/other (specify)/BMI = 18.5; no edema noted Nutrition focused physical exam conducted:    Subcutaneous fat loss: [moderate ] Orbital fat pads region, [unable ]Buccal fat region, [ severe]Triceps region,  [severe ]Ribs region.    Muscle wasting: [severe ]Temples region, [severe ]Clavicle region, [ severe]Shoulder region, [severe ]Scapula region, [unable ]Interosseous region,  [moderate ]thigh region, [severe ]Calf region

## 2020-08-28 NOTE — DISCHARGE NOTE PROVIDER - HOSPITAL COURSE
89 y/o F with PMH of  OA and dementia brought in for AMS.  Patient was nonverbal in ED.  Patient was reportedly found on the floor at home for an unknown time   Patient reportedly lives alone.  Per EMS report, patient was noted to have slurred speech, facial droop, and poor strength in the R arm.  Hypertensive in ED, improved with medications.  Labs show mildly elevated CK. CT head negative.              CVA ruled out. MRI neg for acute CVA    - Chronic ischemic changes    - Continue ASA, no statin for rhabdo, LDH ok    - DC to rehab        Rhabdomyolysis:    - Resolved        chronic dislocation of right shoulder/ Severe arthrosis of left shoulder:    - No intervention now    - Outpt follow up with rheum/ ortho    analgesics         HTN:    - BP acceptable, continue current meds        Discussed with daughter on phone.

## 2020-08-28 NOTE — CHART NOTE - NSCHARTNOTEFT_GEN_A_CORE
Upon Nutritional Assessment by the Registered Dietitian your patient was determined to meet criteria / has evidence of the following diagnosis/diagnoses:          [ ]  Mild Protein Calorie Malnutrition        [ ]  Moderate Protein Calorie Malnutrition        [X ] Severe Protein Calorie Malnutrition        [ ] Unspecified Protein Calorie Malnutrition        [X ] Underweight / BMI <19        [ ] Morbid Obesity / BMI > 40      Findings as based on:  •  Comprehensive nutrition assessment and consultation  •  Calorie counts (nutrient intake analysis)  •  Food acceptance and intake status from observations by staff  •  Follow up  •  Patient education  •  Intervention secondary to interdisciplinary rounds  •   concerns      Treatment:    The following diet has been recommended:  Pureed consistency (per family request) c Ensure Enlive x 2/day (provides 700 kcal, 40 g protein) for additional nutrition support      PROVIDER Section:     By signing this assessment you are acknowledging and agree with the diagnosis/diagnoses assigned by the Registered Dietitian    Comments:

## 2020-08-28 NOTE — DIETITIAN INITIAL EVALUATION ADULT. - PERTINENT LABORATORY DATA
08-28 Na138 mmol/L Glu 85 mg/dL K+ 3.5 mmol/L Cr  0.58 mg/dL BUN 13 mg/dL 08-26 Alb 3.0 g/dL<L> 08-27 Chol 143 mg/dL LDL 61 mg/dL HDL 72 mg/dL Trig 51 mg/dL, A1C 5.3%

## 2020-08-28 NOTE — DIETITIAN INITIAL EVALUATION ADULT. - OTHER INFO
Pt seems confused; not making sense; garbled speech, difficult to understand. Spoke to pt kam Srinivasan (([phone]) reports pt insists on her independence despite not being able to care for herself. She wants pt to come live c her but pt wants to continue to live in her home; has assistance in evening for 1-2 hrs only; HHA helps c cooking; niece does shopping. Niece can't really speak to how much pt is eating; suspects poor appetite; buys her nutritional supplements; sometime she drinks them & sometimes she doesn't. Pt c upper dentures only. No reports of any N/V/C/D or swallowing difficulty. Per previous admission info pt wt has been stable since last June (2019).  Niece requested pureed consistency as pt asked her to cut up the mechanical soft consistency when she was visiting yesterday.

## 2020-08-28 NOTE — DISCHARGE NOTE PROVIDER - NSDCMRMEDTOKEN_GEN_ALL_CORE_FT
acetaminophen 325 mg oral tablet: 2 tab(s) orally every 6 hours, As needed, Temp greater or equal to 38C (100.4F), Moderate Pain (4 - 6)  aspirin 81 mg oral delayed release tablet: 1 tab(s) orally once a day  hydrALAZINE 25 mg oral tablet: 1 tab(s) orally every 8 hours  oxycodone-acetaminophen 5 mg-325 mg oral tablet: 1 tab(s) orally every 4 hours, As needed, Severe Pain (7 - 10)

## 2020-08-28 NOTE — DISCHARGE NOTE NURSING/CASE MANAGEMENT/SOCIAL WORK - PATIENT PORTAL LINK FT
You can access the FollowMyHealth Patient Portal offered by [facility] by registering at the following website: [email or web address]. By joining [employer]’s FollowMyHealth portal, you will also be able to view your health information using other applications (apps) compatible with our system.

## 2020-08-28 NOTE — DISCHARGE NOTE PROVIDER - NSDCCPCAREPLAN_GEN_ALL_CORE_FT
PRINCIPAL DISCHARGE DIAGNOSIS  Diagnosis: Non-traumatic rhabdomyolysis  Assessment and Plan of Treatment: resolved      SECONDARY DISCHARGE DIAGNOSES  Diagnosis: Unsteady gait  Assessment and Plan of Treatment: - CVA ruled out. MRI neg for acute CVA  - Chronic ischemic changes  - Continue ASA, no statin for rhabdo, LDH ok  - DC to rehab

## 2020-09-02 DIAGNOSIS — E43 UNSPECIFIED SEVERE PROTEIN-CALORIE MALNUTRITION: ICD-10-CM

## 2020-09-02 DIAGNOSIS — R26.81 UNSTEADINESS ON FEET: ICD-10-CM

## 2020-09-02 DIAGNOSIS — F03.90 UNSPECIFIED DEMENTIA WITHOUT BEHAVIORAL DISTURBANCE: ICD-10-CM

## 2020-09-02 DIAGNOSIS — M62.82 RHABDOMYOLYSIS: ICD-10-CM

## 2020-09-02 DIAGNOSIS — Z98.42 CATARACT EXTRACTION STATUS, LEFT EYE: ICD-10-CM

## 2020-09-02 DIAGNOSIS — Z98.41 CATARACT EXTRACTION STATUS, RIGHT EYE: ICD-10-CM

## 2020-09-02 DIAGNOSIS — M19.011 PRIMARY OSTEOARTHRITIS, RIGHT SHOULDER: ICD-10-CM

## 2020-09-02 DIAGNOSIS — M24.411 RECURRENT DISLOCATION, RIGHT SHOULDER: ICD-10-CM

## 2020-09-02 DIAGNOSIS — Z79.82 LONG TERM (CURRENT) USE OF ASPIRIN: ICD-10-CM

## 2020-09-02 DIAGNOSIS — I10 ESSENTIAL (PRIMARY) HYPERTENSION: ICD-10-CM

## 2020-09-11 NOTE — ED PROVIDER NOTE - CPE EDP SKIN NORM
Patient: Coleen Rice    * No procedures listed *    Diagnosis:* No pre-op diagnosis entered *  Diagnosis Additional Information: No value filed.    Anesthesia Type:  No value filed.    Note:  Anesthesia Post Evaluation    Patient location during evaluation: ICU  Patient participation: Unable to evaluate secondary to administered sedation  Level of consciousness: obtunded/minimal responses  Pain management: unable to assess  Cardiovascular status: hypotensive and acceptable  Respiratory status: ETT  Hydration status: stable  PONV: unable to assess       Comments: A line placement in ICU, see procedure note.         Last vitals:  Vitals:    09/10/20 1900 09/10/20 1905 09/10/20 1920   BP: 121/54 (!) 146/75    Pulse: 133 131 128   Resp: 19 19 20   Temp:      SpO2: 91% 99% 96%         Electronically Signed By: Jacob Bermudez MD  September 10, 2020  8:02 PM   normal...

## 2021-02-13 ENCOUNTER — INPATIENT (INPATIENT)
Facility: HOSPITAL | Age: 86
LOS: 3 days | Discharge: HOME | End: 2021-02-17
Attending: INTERNAL MEDICINE | Admitting: INTERNAL MEDICINE
Payer: MEDICARE

## 2021-02-13 VITALS
SYSTOLIC BLOOD PRESSURE: 126 MMHG | HEART RATE: 73 BPM | DIASTOLIC BLOOD PRESSURE: 60 MMHG | HEIGHT: 61 IN | WEIGHT: 119.93 LBS | OXYGEN SATURATION: 98 % | TEMPERATURE: 98 F | RESPIRATION RATE: 16 BRPM

## 2021-02-13 DIAGNOSIS — H26.9 UNSPECIFIED CATARACT: Chronic | ICD-10-CM
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
## 2021-02-13 DIAGNOSIS — R55 SYNCOPE AND COLLAPSE: ICD-10-CM

## 2021-02-13 LAB
ALBUMIN SERPL ELPH-MCNC: 3.1 G/DL — LOW (ref 3.3–5)
ALP SERPL-CCNC: 178 U/L — HIGH (ref 40–120)
ALT FLD-CCNC: 18 U/L — SIGNIFICANT CHANGE UP (ref 12–78)
ANION GAP SERPL CALC-SCNC: 5 MMOL/L — SIGNIFICANT CHANGE UP (ref 5–17)
APTT BLD: 25.5 SEC — LOW (ref 27.5–35.5)
AST SERPL-CCNC: 21 U/L — SIGNIFICANT CHANGE UP (ref 15–37)
BASOPHILS # BLD AUTO: 0.04 K/UL — SIGNIFICANT CHANGE UP (ref 0–0.2)
BASOPHILS NFR BLD AUTO: 0.7 % — SIGNIFICANT CHANGE UP (ref 0–2)
BILIRUB SERPL-MCNC: 0.3 MG/DL — SIGNIFICANT CHANGE UP (ref 0.2–1.2)
BUN SERPL-MCNC: 26 MG/DL — HIGH (ref 7–23)
CALCIUM SERPL-MCNC: 9 MG/DL — SIGNIFICANT CHANGE UP (ref 8.5–10.1)
CHLORIDE SERPL-SCNC: 105 MMOL/L — SIGNIFICANT CHANGE UP (ref 96–108)
CK MB CFR SERPL CALC: 1.6 NG/ML — SIGNIFICANT CHANGE UP (ref 0.5–3.6)
CO2 SERPL-SCNC: 30 MMOL/L — SIGNIFICANT CHANGE UP (ref 22–31)
CREAT SERPL-MCNC: 1.12 MG/DL — SIGNIFICANT CHANGE UP (ref 0.5–1.3)
EOSINOPHIL # BLD AUTO: 0.16 K/UL — SIGNIFICANT CHANGE UP (ref 0–0.5)
EOSINOPHIL NFR BLD AUTO: 2.9 % — SIGNIFICANT CHANGE UP (ref 0–6)
FLUAV AG NPH QL: SIGNIFICANT CHANGE UP
FLUBV AG NPH QL: SIGNIFICANT CHANGE UP
GLUCOSE SERPL-MCNC: 104 MG/DL — HIGH (ref 70–99)
HCT VFR BLD CALC: 35.7 % — SIGNIFICANT CHANGE UP (ref 34.5–45)
HGB BLD-MCNC: 11.3 G/DL — LOW (ref 11.5–15.5)
IMM GRANULOCYTES NFR BLD AUTO: 0.4 % — SIGNIFICANT CHANGE UP (ref 0–1.5)
INR BLD: 1.02 RATIO — SIGNIFICANT CHANGE UP (ref 0.88–1.16)
LYMPHOCYTES # BLD AUTO: 1.07 K/UL — SIGNIFICANT CHANGE UP (ref 1–3.3)
LYMPHOCYTES # BLD AUTO: 19.2 % — SIGNIFICANT CHANGE UP (ref 13–44)
MAGNESIUM SERPL-MCNC: 2.3 MG/DL — SIGNIFICANT CHANGE UP (ref 1.6–2.6)
MCHC RBC-ENTMCNC: 30.1 PG — SIGNIFICANT CHANGE UP (ref 27–34)
MCHC RBC-ENTMCNC: 31.7 GM/DL — LOW (ref 32–36)
MCV RBC AUTO: 95.2 FL — SIGNIFICANT CHANGE UP (ref 80–100)
MONOCYTES # BLD AUTO: 0.61 K/UL — SIGNIFICANT CHANGE UP (ref 0–0.9)
MONOCYTES NFR BLD AUTO: 11 % — SIGNIFICANT CHANGE UP (ref 2–14)
NEUTROPHILS # BLD AUTO: 3.67 K/UL — SIGNIFICANT CHANGE UP (ref 1.8–7.4)
NEUTROPHILS NFR BLD AUTO: 65.8 % — SIGNIFICANT CHANGE UP (ref 43–77)
NRBC # BLD: 0 /100 WBCS — SIGNIFICANT CHANGE UP (ref 0–0)
PLATELET # BLD AUTO: 218 K/UL — SIGNIFICANT CHANGE UP (ref 150–400)
POTASSIUM SERPL-MCNC: 4.4 MMOL/L — SIGNIFICANT CHANGE UP (ref 3.5–5.3)
POTASSIUM SERPL-SCNC: 4.4 MMOL/L — SIGNIFICANT CHANGE UP (ref 3.5–5.3)
PROT SERPL-MCNC: 7.5 GM/DL — SIGNIFICANT CHANGE UP (ref 6–8.3)
PROTHROM AB SERPL-ACNC: 11.8 SEC — SIGNIFICANT CHANGE UP (ref 10.6–13.6)
RBC # BLD: 3.75 M/UL — LOW (ref 3.8–5.2)
RBC # FLD: 14.7 % — HIGH (ref 10.3–14.5)
SARS-COV-2 RNA SPEC QL NAA+PROBE: SIGNIFICANT CHANGE UP
SODIUM SERPL-SCNC: 140 MMOL/L — SIGNIFICANT CHANGE UP (ref 135–145)
TROPONIN I SERPL-MCNC: 0.12 NG/ML — HIGH (ref 0.01–0.04)
TROPONIN I SERPL-MCNC: 0.13 NG/ML — HIGH (ref 0.01–0.04)
WBC # BLD: 5.57 K/UL — SIGNIFICANT CHANGE UP (ref 3.8–10.5)
WBC # FLD AUTO: 5.57 K/UL — SIGNIFICANT CHANGE UP (ref 3.8–10.5)

## 2021-02-13 PROCEDURE — 70450 CT HEAD/BRAIN W/O DYE: CPT | Mod: 26,MH

## 2021-02-13 PROCEDURE — 71045 X-RAY EXAM CHEST 1 VIEW: CPT | Mod: 26

## 2021-02-13 PROCEDURE — 99285 EMERGENCY DEPT VISIT HI MDM: CPT

## 2021-02-13 PROCEDURE — 93010 ELECTROCARDIOGRAM REPORT: CPT

## 2021-02-13 RX ORDER — HYDRALAZINE HCL 50 MG
25 TABLET ORAL EVERY 8 HOURS
Refills: 0 | Status: DISCONTINUED | OUTPATIENT
Start: 2021-02-13 | End: 2021-02-17

## 2021-02-13 RX ORDER — ACETAMINOPHEN 500 MG
650 TABLET ORAL EVERY 6 HOURS
Refills: 0 | Status: DISCONTINUED | OUTPATIENT
Start: 2021-02-13 | End: 2021-02-17

## 2021-02-13 RX ORDER — ASPIRIN/CALCIUM CARB/MAGNESIUM 324 MG
81 TABLET ORAL DAILY
Refills: 0 | Status: DISCONTINUED | OUTPATIENT
Start: 2021-02-13 | End: 2021-02-17

## 2021-02-13 RX ORDER — OXYCODONE AND ACETAMINOPHEN 5; 325 MG/1; MG/1
1 TABLET ORAL EVERY 4 HOURS
Refills: 0 | Status: DISCONTINUED | OUTPATIENT
Start: 2021-02-13 | End: 2021-02-17

## 2021-02-13 RX ORDER — ASPIRIN/CALCIUM CARB/MAGNESIUM 324 MG
325 TABLET ORAL ONCE
Refills: 0 | Status: COMPLETED | OUTPATIENT
Start: 2021-02-13 | End: 2021-02-13

## 2021-02-13 RX ADMIN — OXYCODONE AND ACETAMINOPHEN 1 TABLET(S): 5; 325 TABLET ORAL at 17:25

## 2021-02-13 RX ADMIN — Medication 325 MILLIGRAM(S): at 14:08

## 2021-02-13 RX ADMIN — Medication 25 MILLIGRAM(S): at 21:27

## 2021-02-13 NOTE — H&P ADULT - HISTORY OF PRESENT ILLNESS
89 year old female w/PMH HTN, mild dementia, arthritis presents with syncope witnessed by aid. Per sister, it has happened 3 times in the past week.

## 2021-02-13 NOTE — ED PROVIDER NOTE - OBJECTIVE STATEMENT
Pt is a 89 year old female w/PMH of arthritis and right shoulder dislocation who presents to the ED today for syncope witnessed by aid. Pt felt 4 times in the last 2 week. Pt is AO x 2 but is a relatively good historian. Pt was eating this morning had a bowel movement and felt nauseous. denies headaches, dizziness, SOB, vomit, diarrhea, abdominal pain. Pt is a 89 year old female w/PMH HTN, mild dementia, arthritis presents with syncope witnessed by aid. Per sister, it has happened 3 times in the past week. She did not witness today, but witnessed it the 2nd time. Pt was sitting down when her eyes rolled back and was unresponsive until EMS arrival. EMS came, but pt was back to normal. Pt states she felt fine, only a little nauseous and unclear what happened. Pt was admitted for TIA/CVA 5 months ago and sent to rehab. denies headaches, dizziness, SOB, vomit, diarrhea, abdominal pain.    No fever/chills, No photophobia/eye pain/changes in vision, No ear pain/sore throat/dysphagia, No chest pain/palpitations, no SOB/cough, no wheeze/stridor, No abdominal pain, No N/V/D, no dysuria/frequency/discharge, No neck/back pain, no rash, no changes in neurological status/function.

## 2021-02-13 NOTE — ED ADULT NURSE NOTE - CHIEF COMPLAINT QUOTE
brought by ems for syncopal episode . witnessed by home health aid . No injury noted  . This happened four times in two weeks. ems placed an IV 20gauge on the rt forearm

## 2021-02-13 NOTE — ED PROVIDER NOTE - CARE PLAN
Principal Discharge DX:	Syncope   Principal Discharge DX:	Syncope  Secondary Diagnosis:	Elevated troponin

## 2021-02-13 NOTE — H&P ADULT - NSHPLABSRESULTS_GEN_ALL_CORE
11.3   5.57  )-----------( 218      ( 13 Feb 2021 12:10 )             35.7     02-13    140  |  105  |  26<H>  ----------------------------<  104<H>  4.4   |  30  |  1.12    Ca    9.0      13 Feb 2021 12:10  Mg     2.3     02-13    TPro  7.5  /  Alb  3.1<L>  /  TBili  0.3  /  DBili  x   /  AST  21  /  ALT  18  /  AlkPhos  178<H>  02-13    PT/INR - ( 13 Feb 2021 12:10 )   PT: 11.8 sec;   INR: 1.02 ratio         PTT - ( 13 Feb 2021 12:10 )  PTT:25.5 sec

## 2021-02-13 NOTE — ED PROVIDER NOTE - PHYSICAL EXAMINATION
Gen: Alert, Well appearing. NAD    Head: NC, AT, PERRL, normal lids/conjunctiva   ENT: Bilateral TM WNL, patent oropharynx without erythema/exudate, uvula midline  Neck: supple, no tenderness/meningismus  Pulm: Bilateral clear BS, normal resp effort  CV: RRR, no M/R/G, +dist pulses   Abd: soft, NT/ND, +BS, no guarding/rebound tenderness  Mskel: extremities x4 with normal ROM. no ctl spine ttp. no edema/erythema/cyanosis   Skin: no rash, no bruising  Neuro: AAOx2, no sensory/motor deficits, CN 2-12 intact

## 2021-02-13 NOTE — ED PROVIDER NOTE - CLINICAL SUMMARY MEDICAL DECISION MAKING FREE TEXT BOX
pt well appearing, asymptomatic, stable vitals, EKG NSR and on monitor, though mult syncope in past 10 days with elev trop. will admit. grecia sesay

## 2021-02-13 NOTE — ED ADULT TRIAGE NOTE - CHIEF COMPLAINT QUOTE
brought by ems for syncopal episode . witnessed by home health aid . No injury noted  . This happened four times in two weeks brought by ems for syncopal episode . witnessed by home health aid . No injury noted  . This happened four times in two weeks. ems placed an IV 20gauge on the rt forearm

## 2021-02-14 LAB
SARS-COV-2 IGG SERPL QL IA: NEGATIVE — SIGNIFICANT CHANGE UP
SARS-COV-2 IGM SERPL IA-ACNC: <0.1 INDEX — SIGNIFICANT CHANGE UP

## 2021-02-14 PROCEDURE — 93880 EXTRACRANIAL BILAT STUDY: CPT | Mod: 26

## 2021-02-14 PROCEDURE — 93306 TTE W/DOPPLER COMPLETE: CPT | Mod: 26

## 2021-02-14 RX ORDER — HEPARIN SODIUM 5000 [USP'U]/ML
5000 INJECTION INTRAVENOUS; SUBCUTANEOUS EVERY 12 HOURS
Refills: 0 | Status: DISCONTINUED | OUTPATIENT
Start: 2021-02-14 | End: 2021-02-17

## 2021-02-14 RX ADMIN — Medication 650 MILLIGRAM(S): at 04:44

## 2021-02-14 RX ADMIN — HEPARIN SODIUM 5000 UNIT(S): 5000 INJECTION INTRAVENOUS; SUBCUTANEOUS at 17:16

## 2021-02-14 RX ADMIN — Medication 81 MILLIGRAM(S): at 11:51

## 2021-02-14 RX ADMIN — Medication 25 MILLIGRAM(S): at 22:01

## 2021-02-14 RX ADMIN — Medication 650 MILLIGRAM(S): at 10:45

## 2021-02-14 RX ADMIN — Medication 25 MILLIGRAM(S): at 17:21

## 2021-02-14 RX ADMIN — Medication 25 MILLIGRAM(S): at 04:39

## 2021-02-14 NOTE — CONSULT NOTE ADULT - SUBJECTIVE AND OBJECTIVE BOX
HPI:  HPI:  89 year old female w/PMH HTN, mild dementia, arthritis presents with syncope witnessed by aid. Per sister, it has happened 3 times in the past week. (13 Feb 2021 15:31)      Chief Complaint:  Patient is a 89y old  Female who presents with a chief complaint of syncope (15 Feb 2021 11:38)      Review of Systems:    General:  No wt loss, fevers, chills, night sweats  Eyes:  Good vision, no reported pain  ENT:  No sore throat, pain, runny nose, dysphagia  CV:  No pain, palpitations, hypo/hypertension  Resp:  No dyspnea, cough, tachypnea, wheezing  GI:  No pain, nausea, vomiting, diarrhea, constipation           Social History/Family History  SOCHX:   tobacco,  -  alcohol    FMHX: FA/MO  - contributory       Discussed with:  PMD, Family    Physical Exam:    Vital Signs:  Vital Signs Last 24 Hrs  T(C): 36.7 (15 Feb 2021 16:32), Max: 36.7 (15 Feb 2021 16:32)  T(F): 98 (15 Feb 2021 16:32), Max: 98 (15 Feb 2021 16:32)  HR: 69 (15 Feb 2021 21:05) (69 - 89)  BP: 180/80 (15 Feb 2021 21:05) (113/57 - 180/80)  BP(mean): --  RR: 18 (15 Feb 2021 16:32) (18 - 18)  SpO2: 98% (15 Feb 2021 16:32) (97% - 98%)  Daily     Daily   I&O's Summary    14 Feb 2021 07:01  -  15 Feb 2021 07:00  --------------------------------------------------------  IN: 800 mL / OUT: 505 mL / NET: 295 mL    15 Feb 2021 07:01  -  15 Feb 2021 21:11  --------------------------------------------------------  IN: 740 mL / OUT: 0 mL / NET: 740 mL          Chest:  Full & symmetric excursion, no increased effort, breath sounds clear  Cardiovascular:  Regular rhythm, S1, S2, no murmur/rub/S3/S4, no carotid/femoral/abdominal bruit, radial/pedal pulses 2+,   Abdomen:  Soft, non-tender, non-distended, normoactive bowel sounds, no HSM          Assessment:    I am asked to evaluate this patient with syncope  The patient's medical history and lab testing as well as diagnostic testing or reviewed  The patient is noted to have a previous diagnostic echocardiogram as well as carotid artery Doppler and those results appear within normal range.  A repeat diagnostic echocardiogram as well as carotid artery Doppler examination all ordered  Systolic blood pressure is well maintained on the current regimen

## 2021-02-15 DIAGNOSIS — S43.004A UNSPECIFIED DISLOCATION OF RIGHT SHOULDER JOINT, INITIAL ENCOUNTER: ICD-10-CM

## 2021-02-15 RX ADMIN — HEPARIN SODIUM 5000 UNIT(S): 5000 INJECTION INTRAVENOUS; SUBCUTANEOUS at 17:26

## 2021-02-15 RX ADMIN — Medication 25 MILLIGRAM(S): at 05:30

## 2021-02-15 RX ADMIN — OXYCODONE AND ACETAMINOPHEN 1 TABLET(S): 5; 325 TABLET ORAL at 10:02

## 2021-02-15 RX ADMIN — Medication 25 MILLIGRAM(S): at 21:02

## 2021-02-15 RX ADMIN — Medication 25 MILLIGRAM(S): at 11:12

## 2021-02-15 RX ADMIN — HEPARIN SODIUM 5000 UNIT(S): 5000 INJECTION INTRAVENOUS; SUBCUTANEOUS at 05:30

## 2021-02-15 RX ADMIN — Medication 81 MILLIGRAM(S): at 11:12

## 2021-02-15 NOTE — PHYSICAL THERAPY INITIAL EVALUATION ADULT - ADDITIONAL COMMENTS
As per pt, she lives alone in a house, 4 steps to enter with both rails; states to have home health aide 24/7. Ambulates with rolling walker.

## 2021-02-15 NOTE — PHYSICAL THERAPY INITIAL EVALUATION ADULT - PERTINENT HX OF CURRENT PROBLEM, REHAB EVAL
Pt is a 89-y/o, female, admitted to [facility] due to syncope witnessed by aide. As per chart, sister stated, pt had syncopal episode 3x in the past week. Pt has hx of dislocated shoulder, stated to have happened years ago.

## 2021-02-15 NOTE — PHYSICAL THERAPY INITIAL EVALUATION ADULT - GENERAL OBSERVATIONS, REHAB EVAL
Pt encountered supine in bed, +knee sleeve (compression wrap) to both knees, +primafit, +cardiac monitor.

## 2021-02-15 NOTE — PHYSICAL THERAPY INITIAL EVALUATION ADULT - BALANCE TRAINING, PT EVAL
Pt will be independent in sitting, transfers, standing and ambulation with good balance using appropriate assistive device and prevent falls by 2 weeks.

## 2021-02-16 ENCOUNTER — TRANSCRIPTION ENCOUNTER (OUTPATIENT)
Age: 86
End: 2021-02-16

## 2021-02-16 LAB
ANION GAP SERPL CALC-SCNC: 7 MMOL/L — SIGNIFICANT CHANGE UP (ref 5–17)
BUN SERPL-MCNC: 18 MG/DL — SIGNIFICANT CHANGE UP (ref 7–23)
CALCIUM SERPL-MCNC: 9.1 MG/DL — SIGNIFICANT CHANGE UP (ref 8.5–10.1)
CHLORIDE SERPL-SCNC: 106 MMOL/L — SIGNIFICANT CHANGE UP (ref 96–108)
CO2 SERPL-SCNC: 28 MMOL/L — SIGNIFICANT CHANGE UP (ref 22–31)
CREAT SERPL-MCNC: 0.69 MG/DL — SIGNIFICANT CHANGE UP (ref 0.5–1.3)
GLUCOSE SERPL-MCNC: 89 MG/DL — SIGNIFICANT CHANGE UP (ref 70–99)
HCT VFR BLD CALC: 38.4 % — SIGNIFICANT CHANGE UP (ref 34.5–45)
HGB BLD-MCNC: 12 G/DL — SIGNIFICANT CHANGE UP (ref 11.5–15.5)
MCHC RBC-ENTMCNC: 30.2 PG — SIGNIFICANT CHANGE UP (ref 27–34)
MCHC RBC-ENTMCNC: 31.3 GM/DL — LOW (ref 32–36)
MCV RBC AUTO: 96.5 FL — SIGNIFICANT CHANGE UP (ref 80–100)
NRBC # BLD: 0 /100 WBCS — SIGNIFICANT CHANGE UP (ref 0–0)
PLATELET # BLD AUTO: 251 K/UL — SIGNIFICANT CHANGE UP (ref 150–400)
POTASSIUM SERPL-MCNC: 4 MMOL/L — SIGNIFICANT CHANGE UP (ref 3.5–5.3)
POTASSIUM SERPL-SCNC: 4 MMOL/L — SIGNIFICANT CHANGE UP (ref 3.5–5.3)
RBC # BLD: 3.98 M/UL — SIGNIFICANT CHANGE UP (ref 3.8–5.2)
RBC # FLD: 14.7 % — HIGH (ref 10.3–14.5)
SODIUM SERPL-SCNC: 141 MMOL/L — SIGNIFICANT CHANGE UP (ref 135–145)
WBC # BLD: 5.1 K/UL — SIGNIFICANT CHANGE UP (ref 3.8–10.5)
WBC # FLD AUTO: 5.1 K/UL — SIGNIFICANT CHANGE UP (ref 3.8–10.5)

## 2021-02-16 RX ADMIN — Medication 25 MILLIGRAM(S): at 20:19

## 2021-02-16 RX ADMIN — OXYCODONE AND ACETAMINOPHEN 1 TABLET(S): 5; 325 TABLET ORAL at 11:04

## 2021-02-16 RX ADMIN — Medication 81 MILLIGRAM(S): at 11:04

## 2021-02-16 RX ADMIN — HEPARIN SODIUM 5000 UNIT(S): 5000 INJECTION INTRAVENOUS; SUBCUTANEOUS at 18:07

## 2021-02-16 RX ADMIN — Medication 25 MILLIGRAM(S): at 05:14

## 2021-02-16 RX ADMIN — HEPARIN SODIUM 5000 UNIT(S): 5000 INJECTION INTRAVENOUS; SUBCUTANEOUS at 05:14

## 2021-02-16 RX ADMIN — Medication 25 MILLIGRAM(S): at 12:14

## 2021-02-16 RX ADMIN — OXYCODONE AND ACETAMINOPHEN 1 TABLET(S): 5; 325 TABLET ORAL at 18:17

## 2021-02-16 NOTE — DISCHARGE NOTE PROVIDER - HOSPITAL COURSE
89 year old female w/PMH HTN, mild dementia, arthritis presents with syncope witnessed by aid. Per sister, it has happened 3 times in the past week. (13 Feb 2021 15:31). The patient is noted to have a previous diagnostic echocardiogram as well as carotid artery Doppler and those results appear within normal range. A repeat diagnostic echocardiogram as well as carotid artery Doppler examination are noted. The diagnostic echocardiogram that has been repeated appears to be unchanged from previous and there is no aortic stenosis of significance. There are increased velocities in bilateral carotid arteries however no plaque is noted. No events noted on telemetry, patient's vital signs stable. As per Dr. Abdalla, patient is medically stable for discharge.

## 2021-02-16 NOTE — PROGRESS NOTE ADULT - PROBLEM SELECTOR PROBLEM 3
Dislocated shoulder, right, initial encounter

## 2021-02-16 NOTE — DISCHARGE NOTE PROVIDER - NSDCCPCAREPLAN_GEN_ALL_CORE_FT
PRINCIPAL DISCHARGE DIAGNOSIS  Diagnosis: Syncope  Assessment and Plan of Treatment:       SECONDARY DISCHARGE DIAGNOSES  Diagnosis: Elevated troponin  Assessment and Plan of Treatment:

## 2021-02-17 ENCOUNTER — TRANSCRIPTION ENCOUNTER (OUTPATIENT)
Age: 86
End: 2021-02-17

## 2021-02-17 VITALS
OXYGEN SATURATION: 98 % | HEART RATE: 73 BPM | RESPIRATION RATE: 17 BRPM | DIASTOLIC BLOOD PRESSURE: 71 MMHG | SYSTOLIC BLOOD PRESSURE: 138 MMHG

## 2021-02-17 RX ADMIN — HEPARIN SODIUM 5000 UNIT(S): 5000 INJECTION INTRAVENOUS; SUBCUTANEOUS at 04:53

## 2021-02-17 RX ADMIN — Medication 25 MILLIGRAM(S): at 04:53

## 2021-02-17 RX ADMIN — Medication 25 MILLIGRAM(S): at 14:54

## 2021-02-17 RX ADMIN — Medication 81 MILLIGRAM(S): at 11:35

## 2021-02-17 RX ADMIN — OXYCODONE AND ACETAMINOPHEN 1 TABLET(S): 5; 325 TABLET ORAL at 05:48

## 2021-02-17 NOTE — PROGRESS NOTE ADULT - SUBJECTIVE AND OBJECTIVE BOX
89 year old female w/PMH HTN, mild dementia, arthritis presents with syncope witnessed by aid. Per sister, it has happened 3 times in the past week. (13 Feb 2021 15:31)      Chief Complaint:  Patient is a 89y old  Female who presents with a chief complaint of syncope (15 Feb 2021 11:38)      Review of Systems:    General:  No wt loss, fevers, chills, night sweats  Eyes:  Good vision, no reported pain  ENT:  No sore throat, pain, runny nose, dysphagia  CV:  No pain, palpitations, hypo/hypertension  Resp:  No dyspnea, cough, tachypnea, wheezing  GI:  No pain, nausea, vomiting, diarrhea, constipation           Social History/Family History  SOCHX:   tobacco,  -  alcohol    FMHX: FA/MO  - contributory       Discussed with:  PMD, Family    Physical Exam:    Vital Signs:  Vital Signs Last 24 Hrs  T(C): 36.7 (15 Feb 2021 16:32), Max: 36.7 (15 Feb 2021 16:32)  T(F): 98 (15 Feb 2021 16:32), Max: 98 (15 Feb 2021 16:32)  HR: 69 (15 Feb 2021 21:05) (69 - 89)  BP: 180/80 (15 Feb 2021 21:05) (113/57 - 180/80)  BP(mean): --  RR: 18 (15 Feb 2021 16:32) (18 - 18)  SpO2: 98% (15 Feb 2021 16:32) (97% - 98%)  Daily     Daily   I&O's Summary    14 Feb 2021 07:01  -  15 Feb 2021 07:00  --------------------------------------------------------  IN: 800 mL / OUT: 505 mL / NET: 295 mL    15 Feb 2021 07:01  -  15 Feb 2021 21:11  --------------------------------------------------------  IN: 740 mL / OUT: 0 mL / NET: 740 mL          Chest:  Full & symmetric excursion, no increased effort, breath sounds clear  Cardiovascular:  Regular rhythm, S1, S2, no murmur/rub/S3/S4, no carotid/femoral/abdominal bruit, radial/pedal pulses 2+,   Abdomen:  Soft, non-tender, non-distended, normoactive bowel sounds, no HSM          Assessment:    I am asked to evaluate this patient with syncope  The patient's medical history and lab testing as well as diagnostic testing or reviewed  The patient is noted to have a previous diagnostic echocardiogram as well as carotid artery Doppler and those results appear within normal range.  A repeat diagnostic echocardiogram as well as carotid artery Doppler examination are noted  The diagnostic echocardiogram that has been repeated appears to be unchanged from previous and there is no aortic stenosis of significance  There are increased velocities in bilateral carotid arteries however no plaque is noted  Systolic blood pressure is well maintained on the current regimen      
89 year old female w/PMH HTN, mild dementia, arthritis presents with syncope witnessed by aid. Per sister, it has happened 3 times in the past week. (13 Feb 2021 15:31)      Chief Complaint:  Patient is a 89y old  Female who presents with a chief complaint of syncope (15 Feb 2021 11:38)      Review of Systems:    General:  No wt loss, fevers, chills, night sweats  Eyes:  Good vision, no reported pain  ENT:  No sore throat, pain, runny nose, dysphagia  CV:  No pain, palpitations, hypo/hypertension  Resp:  No dyspnea, cough, tachypnea, wheezing  GI:  No pain, nausea, vomiting, diarrhea, constipation           Social History/Family History  SOCHX:   tobacco,  -  alcohol    FMHX: FA/MO  - contributory       Discussed with:  PMD, Family    Physical Exam:    Vital Signs:  Vital Signs Last 24 Hrs  T(C): 36.7 (15 Feb 2021 16:32), Max: 36.7 (15 Feb 2021 16:32)  T(F): 98 (15 Feb 2021 16:32), Max: 98 (15 Feb 2021 16:32)  HR: 69 (15 Feb 2021 21:05) (69 - 89)  BP: 180/80 (15 Feb 2021 21:05) (113/57 - 180/80)  BP(mean): --  RR: 18 (15 Feb 2021 16:32) (18 - 18)  SpO2: 98% (15 Feb 2021 16:32) (97% - 98%)  Daily     Daily   I&O's Summary    14 Feb 2021 07:01  -  15 Feb 2021 07:00  --------------------------------------------------------  IN: 800 mL / OUT: 505 mL / NET: 295 mL    15 Feb 2021 07:01  -  15 Feb 2021 21:11  --------------------------------------------------------  IN: 740 mL / OUT: 0 mL / NET: 740 mL          Chest:  Full & symmetric excursion, no increased effort, breath sounds clear  Cardiovascular:  Regular rhythm, S1, S2, no murmur/rub/S3/S4, no carotid/femoral/abdominal bruit, radial/pedal pulses 2+,   Abdomen:  Soft, non-tender, non-distended, normoactive bowel sounds, no HSM          Assessment:    I am asked to evaluate this patient with syncope  The patient's medical history and lab testing as well as diagnostic testing or reviewed  The patient is noted to have a previous diagnostic echocardiogram as well as carotid artery Doppler and those results appear within normal range.  A repeat diagnostic echocardiogram as well as carotid artery Doppler examination are noted  The diagnostic echocardiogram that has been repeated appears to be unchanged from previous and there is no aortic stenosis of significance  There are increased velocities in bilateral carotid arteries however no plaque is noted    Systolic blood pressure is well maintained on the current regimen      
Patient is a 89y old  Female who presents with a chief complaint of syncope (16 Feb 2021 12:19)      INTERVAL HPI/OVERNIGHT EVENTS:  pt is feeling better  MEDICATIONS  (STANDING):  aspirin enteric coated 81 milliGRAM(s) Oral daily  heparin   Injectable 5000 Unit(s) SubCutaneous every 12 hours  hydrALAZINE 25 milliGRAM(s) Oral every 8 hours    MEDICATIONS  (PRN):  acetaminophen   Tablet .. 650 milliGRAM(s) Oral every 6 hours PRN Temp greater or equal to 38C (100.4F), Mild Pain (1 - 3)  oxycodone    5 mG/acetaminophen 325 mG 1 Tablet(s) Oral every 4 hours PRN Severe Pain (7 - 10)      Allergies    No Known Allergies    Intolerances        REVIEW OF SYSTEMS:  CONSTITUTIONAL: No fever, weight loss, or fatigue  EYES: No eye pain, visual disturbances, or discharge  ENMT:  No difficulty hearing, tinnitus, vertigo; No sinus or throat pain  NECK: No pain or stiffness  BREASTS: No pain, masses, or nipple discharge  RESPIRATORY: No cough, wheezing, chills or hemoptysis; No shortness of breath  CARDIOVASCULAR: No chest pain, palpitations, dizziness, or leg swelling  GASTROINTESTINAL: No abdominal or epigastric pain. No nausea, vomiting, or hematemesis; No diarrhea or constipation. No melena or hematochezia.  GENITOURINARY: No dysuria, frequency, hematuria, or incontinence  NEUROLOGICAL: No headaches, memory loss, loss of strength, numbness, or tremors  SKIN: No itching, burning, rashes, or lesions   LYMPH NODES: No enlarged glands  ENDOCRINE: No heat or cold intolerance; No hair loss  MUSCULOSKELETAL: No joint pain or swelling; No muscle, back, or extremity pain  PSYCHIATRIC: No depression, anxiety, mood swings, or difficulty sleeping  HEME/LYMPH: No easy bruising, or bleeding gums  ALLERGY AND IMMUNOLOGIC: No hives or eczema    Vital Signs Last 24 Hrs  T(C): 36.9 (16 Feb 2021 12:10), Max: 36.9 (16 Feb 2021 12:10)  T(F): 98.5 (16 Feb 2021 12:10), Max: 98.5 (16 Feb 2021 12:10)  HR: 77 (16 Feb 2021 12:10) (69 - 77)  BP: 119/67 (16 Feb 2021 12:10) (103/59 - 180/80)  BP(mean): --  RR: 18 (16 Feb 2021 12:10) (17 - 18)  SpO2: 97% (16 Feb 2021 12:10) (97% - 100%)    PHYSICAL EXAM:  GENERAL: NAD, well-groomed, well-developed  HEAD:  Atraumatic, Normocephalic  EYES: EOMI, PERRLA, conjunctiva and sclera clear  ENMT: No tonsillar erythema, exudates, or enlargement; Moist mucous membranes, Good dentition, No lesions  NECK: Supple, No JVD, Normal thyroid  NERVOUS SYSTEM:  Alert & Oriented X3, Good concentration; Motor Strength 5/5 B/L upper and lower extremities; DTRs 2+ intact and symmetric  CHEST/LUNG: Clear to percussion bilaterally; No rales, rhonchi, wheezing, or rubs  HEART: Regular rate and rhythm; No murmurs, rubs, or gallops  ABDOMEN: Soft, Nontender, Nondistended; Bowel sounds present  EXTREMITIES:  2+ Peripheral Pulses, No clubbing, cyanosis, or edema  LYMPH: No lymphadenopathy noted  SKIN: No rashes or lesions    LABS:                        12.0   5.10  )-----------( 251      ( 16 Feb 2021 07:39 )             38.4     02-16    141  |  106  |  18  ----------------------------<  89  4.0   |  28  |  0.69    Ca    9.1      16 Feb 2021 07:39          CAPILLARY BLOOD GLUCOSE        CULTURES:    HEMOGLOBIN A1C:    CHOLESTEROL:        RADIOLOGY & ADDITIONAL TESTS:    
89 year old female w/PMH HTN, mild dementia, arthritis presents with syncope witnessed by aid. Per sister, it has happened 3 times in the past week. (13 Feb 2021 15:31)      Chief Complaint:  Patient is a 89y old  Female who presents with a chief complaint of syncope (15 Feb 2021 11:38)      Review of Systems:    General:  No wt loss, fevers, chills, night sweats  Eyes:  Good vision, no reported pain  ENT:  No sore throat, pain, runny nose, dysphagia  CV:  No pain, palpitations, hypo/hypertension  Resp:  No dyspnea, cough, tachypnea, wheezing  GI:  No pain, nausea, vomiting, diarrhea, constipation           Social History/Family History  SOCHX:   tobacco,  -  alcohol    FMHX: FA/MO  - contributory       Discussed with:  PMD, Family    Physical Exam:    Vital Signs:  Vital Signs Last 24 Hrs  T(C): 36.7 (15 Feb 2021 16:32), Max: 36.7 (15 Feb 2021 16:32)  T(F): 98 (15 Feb 2021 16:32), Max: 98 (15 Feb 2021 16:32)  HR: 69 (15 Feb 2021 21:05) (69 - 89)  BP: 180/80 (15 Feb 2021 21:05) (113/57 - 180/80)  BP(mean): --  RR: 18 (15 Feb 2021 16:32) (18 - 18)  SpO2: 98% (15 Feb 2021 16:32) (97% - 98%)  Daily     Daily   I&O's Summary    14 Feb 2021 07:01  -  15 Feb 2021 07:00  --------------------------------------------------------  IN: 800 mL / OUT: 505 mL / NET: 295 mL    15 Feb 2021 07:01  -  15 Feb 2021 21:11  --------------------------------------------------------  IN: 740 mL / OUT: 0 mL / NET: 740 mL          Chest:  Full & symmetric excursion, no increased effort, breath sounds clear  Cardiovascular:  Regular rhythm, S1, S2, no murmur/rub/S3/S4, no carotid/femoral/abdominal bruit, radial/pedal pulses 2+,   Abdomen:  Soft, non-tender, non-distended, normoactive bowel sounds, no HSM          Assessment:    I am asked to evaluate this patient with syncope  The patient's medical history and lab testing as well as diagnostic testing or reviewed  The patient is noted to have a previous diagnostic echocardiogram as well as carotid artery Doppler and those results appear within normal range.  A repeat diagnostic echocardiogram as well as carotid artery Doppler examination are noted  The diagnostic echocardiogram that has been repeated appears to be unchanged from previous and there is no aortic stenosis of significance  There are increased velocities in bilateral carotid arteries however no plaque is noted  Systolic blood pressure is well maintained on the current regimen  DC  
Patient is a 89y old  Female who presents with a chief complaint of syncope (14 Feb 2021 10:33)      INTERVAL HPI/OVERNIGHT EVENTS:  pt is feeling better  MEDICATIONS  (STANDING):  aspirin enteric coated 81 milliGRAM(s) Oral daily  heparin   Injectable 5000 Unit(s) SubCutaneous every 12 hours  hydrALAZINE 25 milliGRAM(s) Oral every 8 hours    MEDICATIONS  (PRN):  acetaminophen   Tablet .. 650 milliGRAM(s) Oral every 6 hours PRN Temp greater or equal to 38C (100.4F), Mild Pain (1 - 3)  oxycodone    5 mG/acetaminophen 325 mG 1 Tablet(s) Oral every 4 hours PRN Severe Pain (7 - 10)      Allergies    No Known Allergies    Intolerances        REVIEW OF SYSTEMS:  CONSTITUTIONAL: No fever, weight loss, or fatigue  EYES: No eye pain, visual disturbances, or discharge  ENMT:  No difficulty hearing, tinnitus, vertigo; No sinus or throat pain  NECK: No pain or stiffness  BREASTS: No pain, masses, or nipple discharge  RESPIRATORY: No cough, wheezing, chills or hemoptysis; No shortness of breath  CARDIOVASCULAR: No chest pain, palpitations, dizziness, or leg swelling  GASTROINTESTINAL: No abdominal or epigastric pain. No nausea, vomiting, or hematemesis; No diarrhea or constipation. No melena or hematochezia.  GENITOURINARY: No dysuria, frequency, hematuria, or incontinence  NEUROLOGICAL: No headaches, memory loss, loss of strength, numbness, or tremors  SKIN: No itching, burning, rashes, or lesions   LYMPH NODES: No enlarged glands  ENDOCRINE: No heat or cold intolerance; No hair loss  MUSCULOSKELETAL: No joint pain or swelling; No muscle, back, or extremity pain  PSYCHIATRIC: No depression, anxiety, mood swings, or difficulty sleeping  HEME/LYMPH: No easy bruising, or bleeding gums  ALLERGY AND IMMUNOLOGIC: No hives or eczema    Vital Signs Last 24 Hrs  T(C): 36.4 (15 Feb 2021 10:30), Max: 36.6 (15 Feb 2021 00:20)  T(F): 97.5 (15 Feb 2021 10:30), Max: 97.8 (15 Feb 2021 00:20)  HR: 86 (15 Feb 2021 10:30) (70 - 86)  BP: 122/59 (15 Feb 2021 10:30) (113/57 - 150/72)  BP(mean): --  RR: 18 (15 Feb 2021 10:30) (18 - 18)  SpO2: 98% (15 Feb 2021 10:30) (97% - 98%)    PHYSICAL EXAM:  GENERAL: NAD, well-groomed, well-developed  HEAD:  Atraumatic, Normocephalic  EYES: EOMI, PERRLA, conjunctiva and sclera clear  ENMT: No tonsillar erythema, exudates, or enlargement; Moist mucous membranes, Good dentition, No lesions  NECK: Supple, No JVD, Normal thyroid  NERVOUS SYSTEM:  Alert & Oriented X3, Good concentration; Motor Strength 5/5 B/L upper and lower extremities; DTRs 2+ intact and symmetric  CHEST/LUNG: Clear to percussion bilaterally; No rales, rhonchi, wheezing, or rubs  HEART: Regular rate and rhythm; No murmurs, rubs, or gallops  ABDOMEN: Soft, Nontender, Nondistended; Bowel sounds present  EXTREMITIES:  2+ Peripheral Pulses, No clubbing, cyanosis, or edema  LYMPH: No lymphadenopathy noted  SKIN: No rashes or lesions    LABS:                        11.3   5.57  )-----------( 218      ( 13 Feb 2021 12:10 )             35.7     02-13    140  |  105  |  26<H>  ----------------------------<  104<H>  4.4   |  30  |  1.12    Ca    9.0      13 Feb 2021 12:10  Mg     2.3     02-13    TPro  7.5  /  Alb  3.1<L>  /  TBili  0.3  /  DBili  x   /  AST  21  /  ALT  18  /  AlkPhos  178<H>  02-13    PT/INR - ( 13 Feb 2021 12:10 )   PT: 11.8 sec;   INR: 1.02 ratio         PTT - ( 13 Feb 2021 12:10 )  PTT:25.5 sec    CAPILLARY BLOOD GLUCOSE        CULTURES:    HEMOGLOBIN A1C:    CHOLESTEROL:        RADIOLOGY & ADDITIONAL TESTS:    
Patient is a 89y old  Female who presents with a chief complaint of syncope (13 Feb 2021 15:31)      INTERVAL HPI/OVERNIGHT EVENTS:  pt is feeling better  MEDICATIONS  (STANDING):  aspirin enteric coated 81 milliGRAM(s) Oral daily  heparin   Injectable 5000 Unit(s) SubCutaneous every 12 hours  hydrALAZINE 25 milliGRAM(s) Oral every 8 hours    MEDICATIONS  (PRN):  acetaminophen   Tablet .. 650 milliGRAM(s) Oral every 6 hours PRN Temp greater or equal to 38C (100.4F), Mild Pain (1 - 3)  oxycodone    5 mG/acetaminophen 325 mG 1 Tablet(s) Oral every 4 hours PRN Severe Pain (7 - 10)      Allergies    No Known Allergies    Intolerances        REVIEW OF SYSTEMS:  CONSTITUTIONAL: No fever, weight loss, or fatigue  EYES: No eye pain, visual disturbances, or discharge  ENMT:  No difficulty hearing, tinnitus, vertigo; No sinus or throat pain  NECK: No pain or stiffness  BREASTS: No pain, masses, or nipple discharge  RESPIRATORY: No cough, wheezing, chills or hemoptysis; No shortness of breath  CARDIOVASCULAR: No chest pain, palpitations, dizziness, or leg swelling  GASTROINTESTINAL: No abdominal or epigastric pain. No nausea, vomiting, or hematemesis; No diarrhea or constipation. No melena or hematochezia.  GENITOURINARY: No dysuria, frequency, hematuria, or incontinence  NEUROLOGICAL: No headaches, memory loss, loss of strength, numbness, or tremors  SKIN: No itching, burning, rashes, or lesions   LYMPH NODES: No enlarged glands  ENDOCRINE: No heat or cold intolerance; No hair loss  MUSCULOSKELETAL: No joint pain or swelling; No muscle, back, or extremity pain  PSYCHIATRIC: No depression, anxiety, mood swings, or difficulty sleeping  HEME/LYMPH: No easy bruising, or bleeding gums  ALLERGY AND IMMUNOLOGIC: No hives or eczema    Vital Signs Last 24 Hrs  T(C): 36.4 (15 Feb 2021 10:30), Max: 36.6 (15 Feb 2021 00:20)  T(F): 97.5 (15 Feb 2021 10:30), Max: 97.8 (15 Feb 2021 00:20)  HR: 86 (15 Feb 2021 10:30) (70 - 86)  BP: 122/59 (15 Feb 2021 10:30) (113/57 - 150/72)  BP(mean): --  RR: 18 (15 Feb 2021 10:30) (18 - 18)  SpO2: 98% (15 Feb 2021 10:30) (97% - 98%)    PHYSICAL EXAM:  GENERAL: NAD, well-groomed, well-developed  HEAD:  Atraumatic, Normocephalic  EYES: EOMI, PERRLA, conjunctiva and sclera clear  ENMT: No tonsillar erythema, exudates, or enlargement; Moist mucous membranes, Good dentition, No lesions  NECK: Supple, No JVD, Normal thyroid  NERVOUS SYSTEM:  Alert & Oriented X3, Good concentration; Motor Strength 5/5 B/L upper and lower extremities; DTRs 2+ intact and symmetric  CHEST/LUNG: Clear to percussion bilaterally; No rales, rhonchi, wheezing, or rubs  HEART: Regular rate and rhythm; No murmurs, rubs, or gallops  ABDOMEN: Soft, Nontender, Nondistended; Bowel sounds present  EXTREMITIES:  2+ Peripheral Pulses, No clubbing, cyanosis, or edema  LYMPH: No lymphadenopathy noted  SKIN: No rashes or lesions    LABS:                        11.3   5.57  )-----------( 218      ( 13 Feb 2021 12:10 )             35.7     02-13    140  |  105  |  26<H>  ----------------------------<  104<H>  4.4   |  30  |  1.12    Ca    9.0      13 Feb 2021 12:10  Mg     2.3     02-13    TPro  7.5  /  Alb  3.1<L>  /  TBili  0.3  /  DBili  x   /  AST  21  /  ALT  18  /  AlkPhos  178<H>  02-13    PT/INR - ( 13 Feb 2021 12:10 )   PT: 11.8 sec;   INR: 1.02 ratio         PTT - ( 13 Feb 2021 12:10 )  PTT:25.5 sec    CAPILLARY BLOOD GLUCOSE        CULTURES:    HEMOGLOBIN A1C:    CHOLESTEROL:        RADIOLOGY & ADDITIONAL TESTS:

## 2021-02-17 NOTE — DISCHARGE NOTE NURSING/CASE MANAGEMENT/SOCIAL WORK - PATIENT PORTAL LINK FT
You can access the FollowMyHealth Patient Portal offered by [facility] by registering at the following website: [email or web address]. By joining [employer] [facility] [employer] FollowMyHealth portal, you will also be able to view your health information using other applications (apps) compatible with our system.

## 2021-02-23 DIAGNOSIS — M19.90 UNSPECIFIED OSTEOARTHRITIS, UNSPECIFIED SITE: ICD-10-CM

## 2021-02-23 DIAGNOSIS — S43.004A UNSPECIFIED DISLOCATION OF RIGHT SHOULDER JOINT, INITIAL ENCOUNTER: ICD-10-CM

## 2021-02-23 DIAGNOSIS — Z79.82 LONG TERM (CURRENT) USE OF ASPIRIN: ICD-10-CM

## 2021-02-23 DIAGNOSIS — I10 ESSENTIAL (PRIMARY) HYPERTENSION: ICD-10-CM

## 2021-02-23 DIAGNOSIS — R77.8 OTHER SPECIFIED ABNORMALITIES OF PLASMA PROTEINS: ICD-10-CM

## 2021-02-23 DIAGNOSIS — F03.90 UNSPECIFIED DEMENTIA WITHOUT BEHAVIORAL DISTURBANCE: ICD-10-CM

## 2021-02-23 DIAGNOSIS — R55 SYNCOPE AND COLLAPSE: ICD-10-CM
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
## 2021-03-06 NOTE — PHYSICAL THERAPY INITIAL EVALUATION ADULT - PHYSICAL ASSIST/NONPHYSICAL ASSIST: SIT/STAND, REHAB EVAL
supervision Cheek-To-Nose Interpolation Flap Text: A decision was made to reconstruct the defect utilizing an interpolation axial flap and a staged reconstruction.  A telfa template was made of the defect.  This telfa template was then used to outline the Cheek-To-Nose Interpolation flap.  The donor area for the pedicle flap was then injected with anesthesia.  The flap was excised through the skin and subcutaneous tissue down to the layer of the underlying musculature.  The interpolation flap was carefully excised within this deep plane to maintain its blood supply.  The edges of the donor site were undermined.   The donor site was closed in a primary fashion.  The pedicle was then rotated into position and sutured.  Once the tube was sutured into place, adequate blood supply was confirmed with blanching and refill.  The pedicle was then wrapped with xeroform gauze and dressed appropriately with a telfa and gauze bandage to ensure continued blood supply and protect the attached pedicle.

## 2021-04-09 NOTE — DISCHARGE NOTE PROVIDER - CARE PROVIDER_API CALL
pcp,   Phone: (   )    -  Fax: (   )    -  Follow Up Time:     Ranjan Wallace  ORTHOPAEDIC SURGERY  [facility][note 3][address] [facility], [place] [address]  Phone: [phone]  Fax: [phone]  Follow Up Time:
2

## 2022-02-21 NOTE — ED ADULT NURSE NOTE - NSFALLRSKPASTHIST_ED_ALL_ED
I called the patient to follow up  She states she feels much better after starting the antibiotics for a UTI  She denies any further burning or urgency  She notes she continues with low back pain but states she believes she has arthritis and chronically has aches and pains  The patient reports her weight from yesterday was stable at 274 and her sugar was 138  The patient notes she fell out of bed last night  She did not realize it until she awoke to find her daughter helping her up off the floor  I suggested if this happens again, to look into getting rails for the bed  She can also place pillows on the floor in the meantime  She states she did not injure herself  The patient is aware of 2 appointments for tomorrow: Cardiology at [address] [place] [address] 140pm and she plans on attending both  I will call the patient next week to remind her of her Neph appointment  yes

## 2022-11-15 NOTE — H&P ADULT - NSHPPOAPULMEMBOLUS_GEN_A_CORE
no Xelmargotz Pregnancy And Lactation Text: This medication is Pregnancy Category D and is not considered safe during pregnancy.  The risk during breast feeding is also uncertain.

## 2023-01-23 ENCOUNTER — NON-APPOINTMENT (OUTPATIENT)
Age: 88
End: 2023-01-23

## 2024-10-24 NOTE — SWALLOW BEDSIDE ASSESSMENT ADULT - NS ASR SWALLOW FINDINGS DISCUS
80 yo M with history of CAD s/p stents in 2012 (ASA), locally advanced rectal cancer s/p neoadjuvant therapy with chemoRT (completed 11/2022), consolidation CAPOX (2/2023), brachytherapy (3/2024), presenting for generalized weakness, chest discomfort, rectal pain. Pt was here at [facility] in 8/30 for fatigue, weakness, and falls, found to have perforated rectal mass with perirectal collection, 2.5 x 1.5 cm with spread to infection in anorectal/perineal region with fluid/gas tracking along penile shaft, however pt left for MSK and s/p diverting end sigmoid colostomy, cystoscopy and naranjo placement across defect and perineal/scrotal drainage 8/30. Recently at [facility] for several weeks, discharged day prior to presentation for perineal infections, treated with abx. Represents here for generalized weakness and dizziness, CTAP non-con on preliminary read indicating ill-defined fluid/gas in scrotum, perirectal space, and probably perineum. Exam demonstrates skin duskiness multiple genitourinary abscesses, induration, purulent drainage. 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    10/13: S/p debridement of genitalia and perineum in OR yesterday. In SICU for post op pressor requirements. Got 500 ccs overnight for hypovolemia on POCUS. Currently off pressors. On zosyn, clinda, vanc. U and bcx pending. Tissue cx negative. Wound packing changed at bedside, incision beds with adequate granulation tissue, healing well. Transferred to floor  10/14 - wounds clean; re-packed; penroses in place; ID consult  10/15 - pt refuses blood draw, and refuses dressing change (it has been 24 hrs since last one; he refused last nite also)                 later on he did allow it - wound appears clean, no necrotic tissue; labs drawn  10/16 - pro-BNP high so echo today  10/17 - changed dressing, took out subprapubic incision penrose drain, left scrotal penrose drain. Echo with reduced EF - cards says iso sepsis, no further workup needed. Changed abx from fluconazole to caspo (2 week course) per ID. Can c/w erta for ESBL e.coli. Cards saying he will need AC eventually,   10/18 - changed dressing this am, last penrose in scrotum d/c'ed. wound care spoke with, can try changing wet to dry dressings to aquacel and change qd. Pt's daughter will hire own aid for wound care and PT, but will need homecare for erta and caspo IV at home.   10/19- changed dressing to aquacel, started Eliquis  10/20- refused exam in AM, dressing to be changed with aquacel in PM   10/21 - changed dressing ; will speak with SW/HC about dispo planning; spoke with daughter for plan; see note by   10/22 - dressing changed; R. arm has swelling, though improved from last hospitalization, as per daughter; testing at [facility] did not reveal a clot, but we will repeat                Pt had blood from rectum today, daughter claims that happened at [facility] when eliquis was re-started; I d/c'd eliquis               d/w at great length his needs for home; she does not want to take him home until ABX's are done (next Tues); she wants study R. upper ext swelling even though it was done                at [facility] and was neg  10/23 - daughter supposedly will come today to learn dressing and colostomy care; we will change dressing then, and speak again to daughter about rectal Ca and GOC; apparently she has                  not wanted to discuss it in the past, R. arm duplex neg, xray negative, psych consult requested  10/24 - dressings changed this AM, yesterday daughter instructed as to dressing changes and colostomy care, psych to see pt again today as he did not participate in the interview, no stool in colostomy ? gas overnight, will get AXR    - cont erta and fluconazole 10/29  - dressing change qd with medi-honey aquacel per wound care   - continue naranjo; will change before d/c   - continue to hold Eliquis   - f/u AXR  - f/u medicine  - f/u psych  - f/u ID              Patient/Nursing

## 2025-05-21 NOTE — PROGRESS NOTE ADULT - REASON FOR ADMISSION
passed out
post op; at least 3+/5 in b/l LEs as seen with functional mobility/grossly assessed due to

## 2025-07-05 ENCOUNTER — INPATIENT (INPATIENT)
Facility: HOSPITAL | Age: 89
LOS: 5 days | Discharge: NURSING, REHAB OR LONG-TERM CARE | End: 2025-07-11
Attending: STUDENT IN AN ORGANIZED HEALTH CARE EDUCATION/TRAINING PROGRAM | Admitting: STUDENT IN AN ORGANIZED HEALTH CARE EDUCATION/TRAINING PROGRAM
Payer: MEDICARE

## 2025-07-05 VITALS
WEIGHT: 160.06 LBS | RESPIRATION RATE: 17 BRPM | TEMPERATURE: 97 F | OXYGEN SATURATION: 98 % | HEART RATE: 69 BPM | HEIGHT: 65 IN | SYSTOLIC BLOOD PRESSURE: 153 MMHG | DIASTOLIC BLOOD PRESSURE: 80 MMHG

## 2025-07-05 DIAGNOSIS — H26.9 UNSPECIFIED CATARACT: Chronic | ICD-10-CM

## 2025-07-05 PROCEDURE — 99285 EMERGENCY DEPT VISIT HI MDM: CPT

## 2025-07-05 NOTE — ED ADULT TRIAGE NOTE - CHIEF COMPLAINT QUOTE
From home aid told EMS pt fall out of bed last night shortened of left leg usually walks with the waker today did not get out of bed today c/o left leg pain

## 2025-07-06 DIAGNOSIS — Z29.9 ENCOUNTER FOR PROPHYLACTIC MEASURES, UNSPECIFIED: ICD-10-CM

## 2025-07-06 DIAGNOSIS — N18.9 CHRONIC KIDNEY DISEASE, UNSPECIFIED: ICD-10-CM

## 2025-07-06 DIAGNOSIS — I10 ESSENTIAL (PRIMARY) HYPERTENSION: ICD-10-CM

## 2025-07-06 DIAGNOSIS — S72.002A FRACTURE OF UNSPECIFIED PART OF NECK OF LEFT FEMUR, INITIAL ENCOUNTER FOR CLOSED FRACTURE: ICD-10-CM

## 2025-07-06 DIAGNOSIS — Z01.818 ENCOUNTER FOR OTHER PREPROCEDURAL EXAMINATION: ICD-10-CM
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
## 2025-07-06 DIAGNOSIS — F03.90 UNSPECIFIED DEMENTIA, UNSPECIFIED SEVERITY, WITHOUT BEHAVIORAL DISTURBANCE, PSYCHOTIC DISTURBANCE, MOOD DISTURBANCE, AND ANXIETY: ICD-10-CM

## 2025-07-06 LAB
ALBUMIN SERPL ELPH-MCNC: 2.9 G/DL — LOW (ref 3.3–5)
ALBUMIN SERPL ELPH-MCNC: 3 G/DL — LOW (ref 3.3–5)
ALP SERPL-CCNC: 113 U/L — SIGNIFICANT CHANGE UP (ref 40–120)
ALP SERPL-CCNC: 126 U/L — HIGH (ref 40–120)
ALT FLD-CCNC: 18 U/L — SIGNIFICANT CHANGE UP (ref 12–78)
ALT FLD-CCNC: 19 U/L — SIGNIFICANT CHANGE UP (ref 12–78)
ANION GAP SERPL CALC-SCNC: 5 MMOL/L — SIGNIFICANT CHANGE UP (ref 5–17)
ANION GAP SERPL CALC-SCNC: 7 MMOL/L — SIGNIFICANT CHANGE UP (ref 5–17)
APTT BLD: 26.6 SEC — SIGNIFICANT CHANGE UP (ref 26.1–36.8)
AST SERPL-CCNC: 22 U/L — SIGNIFICANT CHANGE UP (ref 15–37)
AST SERPL-CCNC: 31 U/L — SIGNIFICANT CHANGE UP (ref 15–37)
BASOPHILS # BLD AUTO: 0.02 K/UL — SIGNIFICANT CHANGE UP (ref 0–0.2)
BASOPHILS # BLD AUTO: 0.02 K/UL — SIGNIFICANT CHANGE UP (ref 0–0.2)
BASOPHILS NFR BLD AUTO: 0.3 % — SIGNIFICANT CHANGE UP (ref 0–2)
BASOPHILS NFR BLD AUTO: 0.3 % — SIGNIFICANT CHANGE UP (ref 0–2)
BILIRUB SERPL-MCNC: 0.7 MG/DL — SIGNIFICANT CHANGE UP (ref 0.2–1.2)
BILIRUB SERPL-MCNC: 0.7 MG/DL — SIGNIFICANT CHANGE UP (ref 0.2–1.2)
BLD GP AB SCN SERPL QL: SIGNIFICANT CHANGE UP
BUN SERPL-MCNC: 23 MG/DL — SIGNIFICANT CHANGE UP (ref 7–23)
BUN SERPL-MCNC: 24 MG/DL — HIGH (ref 7–23)
CALCIUM SERPL-MCNC: 9 MG/DL — SIGNIFICANT CHANGE UP (ref 8.5–10.1)
CALCIUM SERPL-MCNC: 9.4 MG/DL — SIGNIFICANT CHANGE UP (ref 8.5–10.1)
CHLORIDE SERPL-SCNC: 105 MMOL/L — SIGNIFICANT CHANGE UP (ref 96–108)
CHLORIDE SERPL-SCNC: 106 MMOL/L — SIGNIFICANT CHANGE UP (ref 96–108)
CO2 SERPL-SCNC: 25 MMOL/L — SIGNIFICANT CHANGE UP (ref 22–31)
CO2 SERPL-SCNC: 26 MMOL/L — SIGNIFICANT CHANGE UP (ref 22–31)
CREAT SERPL-MCNC: 0.97 MG/DL — SIGNIFICANT CHANGE UP (ref 0.5–1.3)
CREAT SERPL-MCNC: 1 MG/DL — SIGNIFICANT CHANGE UP (ref 0.5–1.3)
EGFR: 52 ML/MIN/1.73M2 — LOW
EGFR: 52 ML/MIN/1.73M2 — LOW
EGFR: 54 ML/MIN/1.73M2 — LOW
EGFR: 54 ML/MIN/1.73M2 — LOW
EOSINOPHIL # BLD AUTO: 0.04 K/UL — SIGNIFICANT CHANGE UP (ref 0–0.5)
EOSINOPHIL # BLD AUTO: 0.07 K/UL — SIGNIFICANT CHANGE UP (ref 0–0.5)
EOSINOPHIL NFR BLD AUTO: 0.5 % — SIGNIFICANT CHANGE UP (ref 0–6)
EOSINOPHIL NFR BLD AUTO: 0.9 % — SIGNIFICANT CHANGE UP (ref 0–6)
GLUCOSE SERPL-MCNC: 110 MG/DL — HIGH (ref 70–99)
GLUCOSE SERPL-MCNC: 99 MG/DL — SIGNIFICANT CHANGE UP (ref 70–99)
HCT VFR BLD CALC: 35.5 % — SIGNIFICANT CHANGE UP (ref 34.5–45)
HCT VFR BLD CALC: 37.2 % — SIGNIFICANT CHANGE UP (ref 34.5–45)
HGB BLD-MCNC: 11.5 G/DL — SIGNIFICANT CHANGE UP (ref 11.5–15.5)
HGB BLD-MCNC: 12.1 G/DL — SIGNIFICANT CHANGE UP (ref 11.5–15.5)
IMM GRANULOCYTES NFR BLD AUTO: 0.1 % — SIGNIFICANT CHANGE UP (ref 0–0.9)
IMM GRANULOCYTES NFR BLD AUTO: 0.3 % — SIGNIFICANT CHANGE UP (ref 0–0.9)
INR BLD: 0.99 RATIO — SIGNIFICANT CHANGE UP (ref 0.85–1.16)
LYMPHOCYTES # BLD AUTO: 1.54 K/UL — SIGNIFICANT CHANGE UP (ref 1–3.3)
LYMPHOCYTES # BLD AUTO: 2.11 K/UL — SIGNIFICANT CHANGE UP (ref 1–3.3)
LYMPHOCYTES # BLD AUTO: 20.5 % — SIGNIFICANT CHANGE UP (ref 13–44)
LYMPHOCYTES # BLD AUTO: 27.4 % — SIGNIFICANT CHANGE UP (ref 13–44)
MCHC RBC-ENTMCNC: 31.7 PG — SIGNIFICANT CHANGE UP (ref 27–34)
MCHC RBC-ENTMCNC: 31.8 PG — SIGNIFICANT CHANGE UP (ref 27–34)
MCHC RBC-ENTMCNC: 32.4 G/DL — SIGNIFICANT CHANGE UP (ref 32–36)
MCHC RBC-ENTMCNC: 32.5 G/DL — SIGNIFICANT CHANGE UP (ref 32–36)
MCV RBC AUTO: 97.4 FL — SIGNIFICANT CHANGE UP (ref 80–100)
MCV RBC AUTO: 98.1 FL — SIGNIFICANT CHANGE UP (ref 80–100)
MONOCYTES # BLD AUTO: 1.05 K/UL — HIGH (ref 0–0.9)
MONOCYTES # BLD AUTO: 1.11 K/UL — HIGH (ref 0–0.9)
MONOCYTES NFR BLD AUTO: 14 % — SIGNIFICANT CHANGE UP (ref 2–14)
MONOCYTES NFR BLD AUTO: 14.4 % — HIGH (ref 2–14)
NEUTROPHILS # BLD AUTO: 4.36 K/UL — SIGNIFICANT CHANGE UP (ref 1.8–7.4)
NEUTROPHILS # BLD AUTO: 4.85 K/UL — SIGNIFICANT CHANGE UP (ref 1.8–7.4)
NEUTROPHILS NFR BLD AUTO: 56.7 % — SIGNIFICANT CHANGE UP (ref 43–77)
NEUTROPHILS NFR BLD AUTO: 64.6 % — SIGNIFICANT CHANGE UP (ref 43–77)
NRBC BLD AUTO-RTO: 0 /100 WBCS — SIGNIFICANT CHANGE UP (ref 0–0)
NRBC BLD AUTO-RTO: 0 /100 WBCS — SIGNIFICANT CHANGE UP (ref 0–0)
PLATELET # BLD AUTO: 215 K/UL — SIGNIFICANT CHANGE UP (ref 150–400)
PLATELET # BLD AUTO: 221 K/UL — SIGNIFICANT CHANGE UP (ref 150–400)
POTASSIUM SERPL-MCNC: 4.1 MMOL/L — SIGNIFICANT CHANGE UP (ref 3.5–5.3)
POTASSIUM SERPL-MCNC: 4.7 MMOL/L — SIGNIFICANT CHANGE UP (ref 3.5–5.3)
POTASSIUM SERPL-SCNC: 4.1 MMOL/L — SIGNIFICANT CHANGE UP (ref 3.5–5.3)
POTASSIUM SERPL-SCNC: 4.7 MMOL/L — SIGNIFICANT CHANGE UP (ref 3.5–5.3)
PROT SERPL-MCNC: 7.1 GM/DL — SIGNIFICANT CHANGE UP (ref 6–8.3)
PROT SERPL-MCNC: 7.6 GM/DL — SIGNIFICANT CHANGE UP (ref 6–8.3)
PROTHROM AB SERPL-ACNC: 11.5 SEC — SIGNIFICANT CHANGE UP (ref 9.9–13.4)
RBC # BLD: 3.62 M/UL — LOW (ref 3.8–5.2)
RBC # BLD: 3.82 M/UL — SIGNIFICANT CHANGE UP (ref 3.8–5.2)
RBC # FLD: 13.2 % — SIGNIFICANT CHANGE UP (ref 10.3–14.5)
RBC # FLD: 13.2 % — SIGNIFICANT CHANGE UP (ref 10.3–14.5)
SODIUM SERPL-SCNC: 137 MMOL/L — SIGNIFICANT CHANGE UP (ref 135–145)
SODIUM SERPL-SCNC: 137 MMOL/L — SIGNIFICANT CHANGE UP (ref 135–145)
WBC # BLD: 7.51 K/UL — SIGNIFICANT CHANGE UP (ref 3.8–10.5)
WBC # BLD: 7.69 K/UL — SIGNIFICANT CHANGE UP (ref 3.8–10.5)
WBC # FLD AUTO: 7.51 K/UL — SIGNIFICANT CHANGE UP (ref 3.8–10.5)
WBC # FLD AUTO: 7.69 K/UL — SIGNIFICANT CHANGE UP (ref 3.8–10.5)

## 2025-07-06 PROCEDURE — 99232 SBSQ HOSP IP/OBS MODERATE 35: CPT

## 2025-07-06 PROCEDURE — 73552 X-RAY EXAM OF FEMUR 2/>: CPT | Mod: 26,LT

## 2025-07-06 PROCEDURE — 71045 X-RAY EXAM CHEST 1 VIEW: CPT | Mod: 26

## 2025-07-06 PROCEDURE — 73502 X-RAY EXAM HIP UNI 2-3 VIEWS: CPT | Mod: 26,LT

## 2025-07-06 PROCEDURE — 99222 1ST HOSP IP/OBS MODERATE 55: CPT

## 2025-07-06 PROCEDURE — 93306 TTE W/DOPPLER COMPLETE: CPT | Mod: 26

## 2025-07-06 PROCEDURE — 93010 ELECTROCARDIOGRAM REPORT: CPT

## 2025-07-06 RX ORDER — SODIUM CHLORIDE 9 G/1000ML
1000 INJECTION, SOLUTION INTRAVENOUS
Refills: 0 | Status: DISCONTINUED | OUTPATIENT
Start: 2025-07-06 | End: 2025-07-07

## 2025-07-06 RX ORDER — ENOXAPARIN SODIUM 100 MG/ML
40 INJECTION SUBCUTANEOUS ONCE
Refills: 0 | Status: COMPLETED | OUTPATIENT
Start: 2025-07-06 | End: 2025-07-06

## 2025-07-06 RX ORDER — MAGNESIUM, ALUMINUM HYDROXIDE 200-200 MG
30 TABLET,CHEWABLE ORAL EVERY 4 HOURS
Refills: 0 | Status: DISCONTINUED | OUTPATIENT
Start: 2025-07-06 | End: 2025-07-07

## 2025-07-06 RX ORDER — MELATONIN 5 MG
3 TABLET ORAL AT BEDTIME
Refills: 0 | Status: DISCONTINUED | OUTPATIENT
Start: 2025-07-06 | End: 2025-07-07

## 2025-07-06 RX ORDER — ACETAMINOPHEN 500 MG/5ML
650 LIQUID (ML) ORAL EVERY 6 HOURS
Refills: 0 | Status: DISCONTINUED | OUTPATIENT
Start: 2025-07-06 | End: 2025-07-07

## 2025-07-06 RX ORDER — ACETAMINOPHEN 500 MG/5ML
1000 LIQUID (ML) ORAL ONCE
Refills: 0 | Status: COMPLETED | OUTPATIENT
Start: 2025-07-06 | End: 2025-07-06

## 2025-07-06 RX ORDER — POVIDONE-IODINE 7.5 %
1 SOLUTION, NON-ORAL TOPICAL ONCE
Refills: 0 | Status: COMPLETED | OUTPATIENT
Start: 2025-07-06 | End: 2025-07-07

## 2025-07-06 RX ADMIN — Medication 400 MILLIGRAM(S): at 00:31

## 2025-07-06 RX ADMIN — Medication 650 MILLIGRAM(S): at 23:57

## 2025-07-06 RX ADMIN — ENOXAPARIN SODIUM 40 MILLIGRAM(S): 100 INJECTION SUBCUTANEOUS at 13:31

## 2025-07-06 RX ADMIN — Medication 650 MILLIGRAM(S): at 22:57

## 2025-07-06 RX ADMIN — SODIUM CHLORIDE 75 MILLILITER(S): 9 INJECTION, SOLUTION INTRAVENOUS at 03:58

## 2025-07-06 RX ADMIN — Medication 3 MILLIGRAM(S): at 22:57

## 2025-07-06 RX ADMIN — Medication 2 MILLIGRAM(S): at 00:31

## 2025-07-06 RX ADMIN — SODIUM CHLORIDE 75 MILLILITER(S): 9 INJECTION, SOLUTION INTRAVENOUS at 17:43

## 2025-07-06 NOTE — PROGRESS NOTE ADULT - SUBJECTIVE AND OBJECTIVE BOX
PROGRESS NOTE:     Patient is a 89y old  Female who presents with a chief complaint of Hip fracture (06 Jul 2025 06:49)      SUBJECTIVE / OVERNIGHT EVENTS:No acute events since admission       MEDICATIONS  (STANDING):  chlorhexidine 2% Cloths 1 Application(s) Topical once  lactated ringers. 1000 milliLiter(s) (75 mL/Hr) IV Continuous <Continuous>  povidone iodine 10% Nasal Swab 1 Application(s) Both Nostrils once    MEDICATIONS  (PRN):  acetaminophen     Tablet .. 650 milliGRAM(s) Oral every 6 hours PRN Temp greater or equal to 38C (100.4F), Mild Pain (1 - 3)  aluminum hydroxide/magnesium hydroxide/simethicone Suspension 30 milliLiter(s) Oral every 4 hours PRN Dyspepsia  melatonin 3 milliGRAM(s) Oral at bedtime PRN Insomnia      CAPILLARY BLOOD GLUCOSE        I&O's Summary      PHYSICAL EXAM:  Vital Signs Last 24 Hrs  T(C): 36.7 (06 Jul 2025 08:45), Max: 36.7 (06 Jul 2025 08:45)  T(F): 98 (06 Jul 2025 08:45), Max: 98 (06 Jul 2025 08:45)  HR: 82 (06 Jul 2025 08:45) (69 - 82)  BP: 129/96 (06 Jul 2025 08:45) (104/71 - 153/80)  BP(mean): --  RR: 19 (06 Jul 2025 08:45) (17 - 19)  SpO2: 99% (06 Jul 2025 08:45) (96% - 99%)    Parameters below as of 06 Jul 2025 08:45  Patient On (Oxygen Delivery Method): room air        Physical Exam: GENERAL: NAD  HEAD:  Atraumatic, normocephalic  EYES: EOMI, PERRL  NECK: Supple, trachea midline, no JVD  HEART: Regular rate and rhythm  LUNGS: Unlabored respirations. Clear to auscultation bilaterally, no crackles, wheezing, or rhonchi  ABDOMEN: Soft, nontender, nondistended, +BS  EXTREMITIES: 2+ peripheral pulses bilaterally. LLE ttp, LROM  NERVOUS SYSTEM:  A&Ox1, no gross focal deficits        LABS:                        11.5   7.69  )-----------( 215      ( 06 Jul 2025 08:00 )             35.5     07-06    137  |  106  |  24[H]  ----------------------------<  99  4.1   |  26  |  0.97    Ca    9.4      06 Jul 2025 08:00    TPro  7.1  /  Alb  2.9[L]  /  TBili  0.7  /  DBili  x   /  AST  22  /  ALT  19  /  AlkPhos  113  07-06    PT/INR - ( 05 Jul 2025 23:51 )   PT: 11.5 sec;   INR: 0.99 ratio         PTT - ( 05 Jul 2025 23:51 )  PTT:26.6 sec      Urinalysis Basic - ( 06 Jul 2025 08:00 )    Color: x / Appearance: x / SG: x / pH: x  Gluc: 99 mg/dL / Ketone: x  / Bili: x / Urobili: x   Blood: x / Protein: x / Nitrite: x   Leuk Esterase: x / RBC: x / WBC x   Sq Epi: x / Non Sq Epi: x / Bacteria: x          RADIOLOGY & ADDITIONAL TESTS:  Results Reviewed:   Imaging Personally Reviewed:  Electrocardiogram Personally Reviewed:    COORDINATION OF CARE:  Care Discussed with Consultants/Other Providers [Y/N]:  Prior or Outpatient Records Reviewed [Y/N]:

## 2025-07-06 NOTE — H&P ADULT - NSICDXPASTMEDICALHX_GEN_ALL_CORE_FT
Patient Representative PAST MEDICAL HISTORY:  Arthritis     Brain TIA     CKD (chronic kidney disease)     Dementia     Dislocated shoulder, right, initial encounter     Dysphagia     HTN (hypertension)

## 2025-07-06 NOTE — H&P ADULT - PROBLEM SELECTOR PLAN 7
- Procedure is considered intermediate risk  - RCRI 0 which is 0.4% risk of cardiac death, nonfatal MI, and nonfatal cardiac arrest   - Labs and EKG reviewed, acceptable for planned procedure  - No absolute contraindications for procedure

## 2025-07-06 NOTE — ED ADULT NURSE NOTE - OBJECTIVE STATEMENT
Pt is a 89y F with a pmh of arthritis. Pt BIBA for left leg pain after falling out of bed. Pt denies loc or hitting her head. Pt does ambulate with a walker at bedside.

## 2025-07-06 NOTE — ED PROVIDER NOTE - GASTROINTESTINAL, MLM
Abdomen soft, non-tender, no guarding. no loss of consciousness, no gait abnormality, no headache, no sensory deficits, and no weakness.

## 2025-07-06 NOTE — H&P ADULT - ASSESSMENT
89-year-old female with past medical history of dementia, CKD, hypertension, dysphagia, TIA, osteoarthritis who presented to the ED after a fall.  XR LLE: left IT fx. orthopedic surgery was consulted; recommends medicine admission 2/2 dementia. Planned for OR today. Admit to medicine.

## 2025-07-06 NOTE — ED PROVIDER NOTE - CONSTITUTIONAL, MLM
Well appearing, awake, alert, oriented to person, place and time and in mild distress secondary to pain normal...

## 2025-07-06 NOTE — ED PROVIDER NOTE - CLINICAL SUMMARY MEDICAL DECISION MAKING FREE TEXT BOX
Patient with left hip fracture noted intertrochanteric with orthopedic consultant recommending admission for corrective surgery.  Medicine team to admit.

## 2025-07-06 NOTE — H&P ADULT - PROBLEM SELECTOR PLAN 3
- unclear baseline, AAOx1-2 at this time  - Feeding assistance  - fall precautions  - aspiration precautions

## 2025-07-06 NOTE — CHART NOTE - NSCHARTNOTEFT_GEN_A_CORE
Plan to take patient to OR tomorrow afternoon    A/P: 89yFemale w/ L IT fx    - NWB LLE  - Pain meds as needed  - DVT ppx - SCDs only (okay for one dose of lovenox on 7/6)  - At this time, plan for OR on 7/7 with Dr. Lucas for IMN  - Diet okay for today, NPOMN except for meds  - Please document medical and any other necessary optimization prior to the OR  - Appreciate medical management    Discussed with Dr. Lucas and will update with any further recommendations

## 2025-07-06 NOTE — H&P ADULT - PROBLEM SELECTOR PLAN 2
- fell out of bed s per home aide as per chart review   - unable to obtain collateral info att   - fall precautions

## 2025-07-06 NOTE — ED PROVIDER NOTE - OBJECTIVE STATEMENT
89-year-old female with history of arthritis otherwise presenting to ER due to left hip pain status post fall out of bed last night.  Patient denies any head injury and otherwise has had left leg shortened and painful on movement today.  Unable to stand and walk where baseline she is able to walk with a walker.

## 2025-07-06 NOTE — H&P ADULT - NSHPLABSRESULTS_GEN_ALL_CORE
LABS:  cret                        12.1   7.51  )-----------( 221      ( 05 Jul 2025 23:51 )             37.2     07-05    137  |  105  |  23  ----------------------------<  110[H]  4.7   |  25  |  1.00    Ca    9.0      05 Jul 2025 23:51    TPro  7.6  /  Alb  3.0[L]  /  TBili  0.7  /  DBili  x   /  AST  31  /  ALT  18  /  AlkPhos  126[H]  07-05    PT/INR - ( 05 Jul 2025 23:51 )   PT: 11.5 sec;   INR: 0.99 ratio         PTT - ( 05 Jul 2025 23:51 )  PTT:26.6 sec    XR LLE: left IT fx

## 2025-07-06 NOTE — PATIENT PROFILE ADULT - FALL HARM RISK - HARM RISK INTERVENTIONS
Assistance with ambulation/Assistance OOB with selected safe patient handling equipment/Communicate Risk of Fall with Harm to all staff/Discuss with provider need for PT consult/Monitor for mental status changes/Monitor gait and stability/Move patient closer to nurses' station/Reinforce activity limits and safety measures with patient and family/Reorient to person, place and time as needed/Tailored Fall Risk Interventions/Toileting schedule using arm’s reach rule for commode and bathroom/Use of alarms - bed, chair and/or voice tab/Visual Cue: Yellow wristband and red socks/Bed in lowest position, wheels locked, appropriate side rails in place/Call bell, personal items and telephone in reach/Instruct patient to call for assistance before getting out of bed or chair/Non-slip footwear when patient is out of bed/Foley to call system/Physically safe environment - no spills, clutter or unnecessary equipment/Purposeful Proactive Rounding/Room/bathroom lighting operational, light cord in reach

## 2025-07-06 NOTE — H&P ADULT - PROBLEM SELECTOR PLAN 1
- imaging reviewed as above  - Orthopedic surgery recommended medicine admission  - Surgery planned for am   - Keep NPO  - Pain control with tylenol and morphine

## 2025-07-06 NOTE — ED ADULT NURSE NOTE - NSFALLHARMRISKINTERV_ED_ALL_ED

## 2025-07-06 NOTE — PATIENT PROFILE ADULT - FUNCTIONAL ASSESSMENT - BASIC MOBILITY 6.
Not able to assess (calculate score using AMPAC averaging method)  3-calculated by average/Not able to assess (calculate score using [facility] averaging method)

## 2025-07-06 NOTE — H&P ADULT - PROBLEM SELECTOR PLAN 4
- Cr wnl   - Avoid nephrotoxin  - i/os  - f/u am labs, monitor electrolyte closely and replete as needed

## 2025-07-06 NOTE — H&P ADULT - NSHPPHYSICALEXAM_GEN_ALL_CORE
GENERAL: NAD  HEAD:  Atraumatic, normocephalic  EYES: EOMI, PERRL  NECK: Supple, trachea midline, no JVD  HEART: Regular rate and rhythm  LUNGS: Unlabored respirations. Clear to auscultation bilaterally, no crackles, wheezing, or rhonchi  ABDOMEN: Soft, nontender, nondistended, +BS  EXTREMITIES: 2+ peripheral pulses bilaterally. LLE ttp, LROM  NERVOUS SYSTEM:  A&Ox1, no gross focal deficits

## 2025-07-06 NOTE — H&P ADULT - HISTORY OF PRESENT ILLNESS
89-year-old female with past medical history of dementia, CKD, hypertension, dysphagia, TIA, osteoarthritis who presented to the ED after a fall. Home aide reported that the patient fell out of bed two nights ago and was complaining of left lower extremity during the day which prompted ED visit. At baseline the patient walked with a walker. History: Limited due to dementia.   On presentation, the patient was hypertensive otherwise stable vital signs. EKG with normal sinus rhythm with first-degree AV block, nonspecific ST changes. Labs unremarkable. XR LLE: left IT fx. orthopedic surgery was consulted; recommends medicine admission 2/2 dementia. Patient received Tylenol, morphine in the ED.

## 2025-07-06 NOTE — PROGRESS NOTE ADULT - SUBJECTIVE AND OBJECTIVE BOX
Pt seen and examined at bedside. No acute events overnight. Pain controlled, denies any numbness or tingling. Denies nausea, vomiting, chest pain, or shortness of breath.         LABS:                        12.1   7.51  )-----------( 221      ( 05 Jul 2025 23:51 )             37.2     07-05    137  |  105  |  23  ----------------------------<  110[H]  4.7   |  25  |  1.00    Ca    9.0      05 Jul 2025 23:51    TPro  7.6  /  Alb  3.0[L]  /  TBili  0.7  /  DBili  x   /  AST  31  /  ALT  18  /  AlkPhos  126[H]  07-05    PT/INR - ( 05 Jul 2025 23:51 )   PT: 11.5 sec;   INR: 0.99 ratio         PTT - ( 05 Jul 2025 23:51 )  PTT:26.6 sec  Urinalysis Basic - ( 05 Jul 2025 23:51 )    Color: x / Appearance: x / SG: x / pH: x  Gluc: 110 mg/dL / Ketone: x  / Bili: x / Urobili: x   Blood: x / Protein: x / Nitrite: x   Leuk Esterase: x / RBC: x / WBC x   Sq Epi: x / Non Sq Epi: x / Bacteria: x        VITAL SIGNS:  T(C): 36.6 (07-06-25 @ 05:12), Max: 36.6 (07-06-25 @ 05:12)  HR: 69 (07-06-25 @ 05:12) (69 - 77)  BP: 104/71 (07-06-25 @ 05:12) (104/71 - 153/80)  RR: 19 (07-06-25 @ 05:12) (17 - 19)  SpO2: 99% (07-06-25 @ 05:12) (96% - 99%)        EXAM:  Gen: NAD    Left Lower Extremity:  Soft compartments  Negative calf ttp  +EHL/FHL/TA/GSc  SILT SPN, DPN, Tib, Saph, Bart  DP+           A/P: 89yFemale w/ L IT fx    - NWB LLE  - Pain meds as needed  - DVT ppx - SCDs only  - At this time, plan for OR today with Dr. Lucas for IMN  - NPO except for meds  - Please document medical and any other necessary optimization prior to the OR  - Appreciate medical management    Discussed with Dr. Lucas and will update with any further recommendations

## 2025-07-06 NOTE — CONSULT NOTE ADULT - SUBJECTIVE AND OBJECTIVE BOX
Full note to come. L IT fx, plan for OR tomorrow with Dr. Lucas for L IMN. NPO except for meds. Please document all necessary optimization for surgery in your note. Thank you! Patient is a 89yFemale home-ambulator with walker who presents to [facility] ED w/ a c/o of . Per chart review, aide mentioned that patient was found to have fallen off the bed yesterday, unwilling to get out of bed today. Unable to obtain further history from patient. No other orthopedic concerns at this time.    Medical Hx:  Arthritis    Dislocated shoulder, right, initial encounter        Meds:      Allergies:  No Known Allergies      PHYSICAL EXAM:  T(C): 36.3 (07-05-25 @ 22:11), Max: 36.3 (07-05-25 @ 22:11)  HR: 69 (07-05-25 @ 22:11) (69 - 69)  BP: 153/80 (07-05-25 @ 22:11) (153/80 - 153/80)  RR: 17 (07-05-25 @ 22:11) (17 - 17)  SpO2: 98% (07-05-25 @ 22:11) (98% - 98%)    Gen: NAD    Left Lower Extremity:  Skin intact  Soft compartments  Negative calf ttp  +EHL/FHL/TA/GSc  SILT SPN, DPN, Tib, Saph, Bart  DP+     Secondary Survey:   RLE/RUE/LUE: No TTP over bony prominences, SILT, palpable pulses, full/painless range of motion, compartments soft    Spine: No bony tenderness. No palpable stepoffs.     Imaging - XR LLE: left IT fx, personal read      A/P: 89yFemale w/ L IT fx    - NWB LLE  - Pain meds as needed  - DVT ppx - SCDs only  - At this time, plan for OR tomorrow with Dr. Lucas for IMN  - NPO except for meds  - Please document medical and any other necessary optimization prior to the OR  - Appreciate medical management    Discussed with Dr. Lucas and will update with any further recommendations

## 2025-07-06 NOTE — ED PROVIDER NOTE - MUSCULOSKELETAL MINIMAL EXAM
Left hip with tenderness on palpation anteriorly and laterally with shortening and external rotation of the left leg.

## 2025-07-07 ENCOUNTER — TRANSCRIPTION ENCOUNTER (OUTPATIENT)
Age: 89
End: 2025-07-07

## 2025-07-07 LAB
ANION GAP SERPL CALC-SCNC: 6 MMOL/L — SIGNIFICANT CHANGE UP (ref 5–17)
ANION GAP SERPL CALC-SCNC: 6 MMOL/L — SIGNIFICANT CHANGE UP (ref 5–17)
ANION GAP SERPL CALC-SCNC: 8 MMOL/L — SIGNIFICANT CHANGE UP (ref 5–17)
APTT BLD: 30.8 SEC — SIGNIFICANT CHANGE UP (ref 26.1–36.8)
BUN SERPL-MCNC: 32 MG/DL — HIGH (ref 7–23)
BUN SERPL-MCNC: 35 MG/DL — HIGH (ref 7–23)
BUN SERPL-MCNC: 36 MG/DL — HIGH (ref 7–23)
CALCIUM SERPL-MCNC: 8.9 MG/DL — SIGNIFICANT CHANGE UP (ref 8.5–10.1)
CALCIUM SERPL-MCNC: 9.2 MG/DL — SIGNIFICANT CHANGE UP (ref 8.5–10.1)
CALCIUM SERPL-MCNC: 9.7 MG/DL — SIGNIFICANT CHANGE UP (ref 8.5–10.1)
CHLORIDE SERPL-SCNC: 104 MMOL/L — SIGNIFICANT CHANGE UP (ref 96–108)
CHLORIDE SERPL-SCNC: 106 MMOL/L — SIGNIFICANT CHANGE UP (ref 96–108)
CHLORIDE SERPL-SCNC: 108 MMOL/L — SIGNIFICANT CHANGE UP (ref 96–108)
CO2 SERPL-SCNC: 23 MMOL/L — SIGNIFICANT CHANGE UP (ref 22–31)
CO2 SERPL-SCNC: 27 MMOL/L — SIGNIFICANT CHANGE UP (ref 22–31)
CO2 SERPL-SCNC: 27 MMOL/L — SIGNIFICANT CHANGE UP (ref 22–31)
CREAT SERPL-MCNC: 1.05 MG/DL — SIGNIFICANT CHANGE UP (ref 0.5–1.3)
CREAT SERPL-MCNC: 1.07 MG/DL — SIGNIFICANT CHANGE UP (ref 0.5–1.3)
CREAT SERPL-MCNC: 1.23 MG/DL — SIGNIFICANT CHANGE UP (ref 0.5–1.3)
EGFR: 41 ML/MIN/1.73M2 — LOW
EGFR: 41 ML/MIN/1.73M2 — LOW
EGFR: 48 ML/MIN/1.73M2 — LOW
EGFR: 48 ML/MIN/1.73M2 — LOW
EGFR: 49 ML/MIN/1.73M2 — LOW
EGFR: 49 ML/MIN/1.73M2 — LOW
GLUCOSE SERPL-MCNC: 101 MG/DL — HIGH (ref 70–99)
GLUCOSE SERPL-MCNC: 108 MG/DL — HIGH (ref 70–99)
GLUCOSE SERPL-MCNC: 96 MG/DL — SIGNIFICANT CHANGE UP (ref 70–99)
HCT VFR BLD CALC: 29.4 % — LOW (ref 34.5–45)
HCT VFR BLD CALC: 32.9 % — LOW (ref 34.5–45)
HCT VFR BLD CALC: 33.5 % — LOW (ref 34.5–45)
HGB BLD-MCNC: 10.7 G/DL — LOW (ref 11.5–15.5)
HGB BLD-MCNC: 10.8 G/DL — LOW (ref 11.5–15.5)
HGB BLD-MCNC: 9.7 G/DL — LOW (ref 11.5–15.5)
INR BLD: 1.06 RATIO — SIGNIFICANT CHANGE UP (ref 0.85–1.16)
MCHC RBC-ENTMCNC: 31.6 PG — SIGNIFICANT CHANGE UP (ref 27–34)
MCHC RBC-ENTMCNC: 31.9 G/DL — LOW (ref 32–36)
MCHC RBC-ENTMCNC: 32 PG — SIGNIFICANT CHANGE UP (ref 27–34)
MCHC RBC-ENTMCNC: 32.3 PG — SIGNIFICANT CHANGE UP (ref 27–34)
MCHC RBC-ENTMCNC: 32.8 G/DL — SIGNIFICANT CHANGE UP (ref 32–36)
MCHC RBC-ENTMCNC: 33 G/DL — SIGNIFICANT CHANGE UP (ref 32–36)
MCV RBC AUTO: 97.3 FL — SIGNIFICANT CHANGE UP (ref 80–100)
MCV RBC AUTO: 98 FL — SIGNIFICANT CHANGE UP (ref 80–100)
MCV RBC AUTO: 98.8 FL — SIGNIFICANT CHANGE UP (ref 80–100)
NRBC BLD AUTO-RTO: 0 /100 WBCS — SIGNIFICANT CHANGE UP (ref 0–0)
PLATELET # BLD AUTO: 187 K/UL — SIGNIFICANT CHANGE UP (ref 150–400)
PLATELET # BLD AUTO: 206 K/UL — SIGNIFICANT CHANGE UP (ref 150–400)
PLATELET # BLD AUTO: 208 K/UL — SIGNIFICANT CHANGE UP (ref 150–400)
POTASSIUM SERPL-MCNC: 3.9 MMOL/L — SIGNIFICANT CHANGE UP (ref 3.5–5.3)
POTASSIUM SERPL-MCNC: 4 MMOL/L — SIGNIFICANT CHANGE UP (ref 3.5–5.3)
POTASSIUM SERPL-MCNC: 4 MMOL/L — SIGNIFICANT CHANGE UP (ref 3.5–5.3)
POTASSIUM SERPL-SCNC: 3.9 MMOL/L — SIGNIFICANT CHANGE UP (ref 3.5–5.3)
POTASSIUM SERPL-SCNC: 4 MMOL/L — SIGNIFICANT CHANGE UP (ref 3.5–5.3)
POTASSIUM SERPL-SCNC: 4 MMOL/L — SIGNIFICANT CHANGE UP (ref 3.5–5.3)
PROTHROM AB SERPL-ACNC: 12 SEC — SIGNIFICANT CHANGE UP (ref 9.9–13.4)
RBC # BLD: 3 M/UL — LOW (ref 3.8–5.2)
RBC # BLD: 3.38 M/UL — LOW (ref 3.8–5.2)
RBC # BLD: 3.39 M/UL — LOW (ref 3.8–5.2)
RBC # FLD: 13.2 % — SIGNIFICANT CHANGE UP (ref 10.3–14.5)
RBC # FLD: 13.3 % — SIGNIFICANT CHANGE UP (ref 10.3–14.5)
RBC # FLD: 13.4 % — SIGNIFICANT CHANGE UP (ref 10.3–14.5)
SODIUM SERPL-SCNC: 137 MMOL/L — SIGNIFICANT CHANGE UP (ref 135–145)
SODIUM SERPL-SCNC: 139 MMOL/L — SIGNIFICANT CHANGE UP (ref 135–145)
SODIUM SERPL-SCNC: 139 MMOL/L — SIGNIFICANT CHANGE UP (ref 135–145)
WBC # BLD: 7.55 K/UL — SIGNIFICANT CHANGE UP (ref 3.8–10.5)
WBC # BLD: 8.06 K/UL — SIGNIFICANT CHANGE UP (ref 3.8–10.5)
WBC # BLD: 8.27 K/UL — SIGNIFICANT CHANGE UP (ref 3.8–10.5)
WBC # FLD AUTO: 7.55 K/UL — SIGNIFICANT CHANGE UP (ref 3.8–10.5)
WBC # FLD AUTO: 8.06 K/UL — SIGNIFICANT CHANGE UP (ref 3.8–10.5)
WBC # FLD AUTO: 8.27 K/UL — SIGNIFICANT CHANGE UP (ref 3.8–10.5)

## 2025-07-07 PROCEDURE — 27245 TREAT THIGH FRACTURE: CPT | Mod: LT

## 2025-07-07 DEVICE — K-WIRE STRYKER 3.2MM X 450MM: Type: IMPLANTABLE DEVICE | Site: LEFT | Status: FUNCTIONAL

## 2025-07-07 DEVICE — SCREW GAMMA LAG 10.5X90MM STRL: Type: IMPLANTABLE DEVICE | Site: LEFT | Status: FUNCTIONAL

## 2025-07-07 DEVICE — NAIL TROCHANTERIC 125 DEG 11X170MM: Type: IMPLANTABLE DEVICE | Site: LEFT | Status: FUNCTIONAL

## 2025-07-07 DEVICE — SCREW LOKG 5X32.5MM: Type: IMPLANTABLE DEVICE | Site: LEFT | Status: FUNCTIONAL

## 2025-07-07 DEVICE — PIN ORTHO PRECISION TAPER 3.9X450MM: Type: IMPLANTABLE DEVICE | Site: LEFT | Status: FUNCTIONAL

## 2025-07-07 RX ORDER — CEFAZOLIN SODIUM IN 0.9 % NACL 3 G/100 ML
2000 INTRAVENOUS SOLUTION, PIGGYBACK (ML) INTRAVENOUS EVERY 8 HOURS
Refills: 0 | Status: COMPLETED | OUTPATIENT
Start: 2025-07-07 | End: 2025-07-08

## 2025-07-07 RX ORDER — MAGNESIUM HYDROXIDE 400 MG/5ML
30 SUSPENSION ORAL DAILY
Refills: 0 | Status: DISCONTINUED | OUTPATIENT
Start: 2025-07-07 | End: 2025-07-11

## 2025-07-07 RX ORDER — OXYCODONE HYDROCHLORIDE 30 MG/1
5 TABLET ORAL
Refills: 0 | Status: DISCONTINUED | OUTPATIENT
Start: 2025-07-07 | End: 2025-07-11

## 2025-07-07 RX ORDER — ONDANSETRON HCL/PF 4 MG/2 ML
4 VIAL (ML) INJECTION EVERY 6 HOURS
Refills: 0 | Status: DISCONTINUED | OUTPATIENT
Start: 2025-07-07 | End: 2025-07-11

## 2025-07-07 RX ORDER — B1/B2/B3/B5/B6/B12/VIT C/FOLIC 500-0.5 MG
1 TABLET ORAL DAILY
Refills: 0 | Status: DISCONTINUED | OUTPATIENT
Start: 2025-07-07 | End: 2025-07-11

## 2025-07-07 RX ORDER — TRAMADOL HYDROCHLORIDE 50 MG/1
25 TABLET, FILM COATED ORAL EVERY 6 HOURS
Refills: 0 | Status: DISCONTINUED | OUTPATIENT
Start: 2025-07-07 | End: 2025-07-11

## 2025-07-07 RX ORDER — SENNA 187 MG
2 TABLET ORAL AT BEDTIME
Refills: 0 | Status: DISCONTINUED | OUTPATIENT
Start: 2025-07-07 | End: 2025-07-11

## 2025-07-07 RX ORDER — HYDROMORPHONE/SOD CHLOR,ISO/PF 2 MG/10 ML
0.5 SYRINGE (ML) INJECTION
Refills: 0 | Status: DISCONTINUED | OUTPATIENT
Start: 2025-07-07 | End: 2025-07-07

## 2025-07-07 RX ORDER — FENTANYL CITRATE-0.9 % NACL/PF 100MCG/2ML
25 SYRINGE (ML) INTRAVENOUS
Refills: 0 | Status: DISCONTINUED | OUTPATIENT
Start: 2025-07-07 | End: 2025-07-07

## 2025-07-07 RX ORDER — ENOXAPARIN SODIUM 100 MG/ML
40 INJECTION SUBCUTANEOUS EVERY 24 HOURS
Refills: 0 | Status: DISCONTINUED | OUTPATIENT
Start: 2025-07-08 | End: 2025-07-11

## 2025-07-07 RX ORDER — OXYCODONE HYDROCHLORIDE 30 MG/1
2.5 TABLET ORAL
Refills: 0 | Status: DISCONTINUED | OUTPATIENT
Start: 2025-07-07 | End: 2025-07-11

## 2025-07-07 RX ORDER — POLYETHYLENE GLYCOL 3350 17 G/17G
17 POWDER, FOR SOLUTION ORAL AT BEDTIME
Refills: 0 | Status: DISCONTINUED | OUTPATIENT
Start: 2025-07-07 | End: 2025-07-11

## 2025-07-07 RX ORDER — ACETAMINOPHEN 500 MG/5ML
975 LIQUID (ML) ORAL EVERY 8 HOURS
Refills: 0 | Status: DISCONTINUED | OUTPATIENT
Start: 2025-07-07 | End: 2025-07-11

## 2025-07-07 RX ORDER — ONDANSETRON HCL/PF 4 MG/2 ML
4 VIAL (ML) INJECTION ONCE
Refills: 0 | Status: DISCONTINUED | OUTPATIENT
Start: 2025-07-07 | End: 2025-07-07

## 2025-07-07 RX ORDER — ACETAMINOPHEN 500 MG/5ML
1000 LIQUID (ML) ORAL ONCE
Refills: 0 | Status: DISCONTINUED | OUTPATIENT
Start: 2025-07-07 | End: 2025-07-11

## 2025-07-07 RX ORDER — ASPIRIN 325 MG
81 TABLET ORAL DAILY
Refills: 0 | Status: DISCONTINUED | OUTPATIENT
Start: 2025-07-08 | End: 2025-07-11

## 2025-07-07 RX ADMIN — OXYCODONE HYDROCHLORIDE 5 MILLIGRAM(S): 30 TABLET ORAL at 19:35

## 2025-07-07 RX ADMIN — Medication 75 MILLILITER(S): at 16:22

## 2025-07-07 RX ADMIN — Medication 2 TABLET(S): at 21:22

## 2025-07-07 RX ADMIN — Medication 100 MILLIGRAM(S): at 21:23

## 2025-07-07 RX ADMIN — POLYETHYLENE GLYCOL 3350 17 GRAM(S): 17 POWDER, FOR SOLUTION ORAL at 21:22

## 2025-07-07 RX ADMIN — Medication 25 MICROGRAM(S): at 15:36

## 2025-07-07 RX ADMIN — SODIUM CHLORIDE 75 MILLILITER(S): 9 INJECTION, SOLUTION INTRAVENOUS at 05:23

## 2025-07-07 RX ADMIN — Medication 1 APPLICATION(S): at 08:00

## 2025-07-07 RX ADMIN — Medication 25 MICROGRAM(S): at 15:19

## 2025-07-07 RX ADMIN — Medication 1 APPLICATION(S): at 05:23

## 2025-07-07 RX ADMIN — Medication 975 MILLIGRAM(S): at 22:22

## 2025-07-07 RX ADMIN — Medication 1 APPLICATION(S): at 05:27

## 2025-07-07 RX ADMIN — OXYCODONE HYDROCHLORIDE 5 MILLIGRAM(S): 30 TABLET ORAL at 18:35

## 2025-07-07 RX ADMIN — Medication 975 MILLIGRAM(S): at 21:22

## 2025-07-07 NOTE — PHYSICAL THERAPY INITIAL EVALUATION ADULT - IMPAIRMENTS FOUND, PT EVAL
aerobic capacity/endurance/gait, locomotion, and balance/muscle strength/ROM aerobic capacity/endurance/arousal, attention, and cognition/cognitive impairment/decreased midline orientation/ergonomics and body mechanics/gait, locomotion, and balance/muscle strength/poor safety awareness/ROM

## 2025-07-07 NOTE — DISCHARGE NOTE PROVIDER - NSDCMRMEDTOKEN_GEN_ALL_CORE_FT
acetaminophen 325 mg oral tablet: 2 tab(s) orally every 6 hours, As needed, Temp greater or equal to 38C (100.4F), Moderate Pain (4 - 6)  aspirin 81 mg oral delayed release tablet: 1 tab(s) orally once a day  hydrALAZINE 25 mg oral tablet: 1 tab(s) orally every 8 hours  oxycodone-acetaminophen 5 mg-325 mg oral tablet: 1 tab(s) orally every 4 hours, As needed, Severe Pain (7 - 10)   aspirin 81 mg oral tablet, chewable: 1 tab(s) orally once a day  Multiple Vitamins oral tablet: 1 tab(s) orally once a day  oxycodone-acetaminophen 5 mg-325 mg oral tablet: 1 tab(s) orally every 4 hours, As needed, Severe Pain (7 - 10)  pantoprazole 40 mg oral delayed release tablet: 1 tab(s) orally once a day (before a meal)  polyethylene glycol 3350 oral powder for reconstitution: 17 gram(s) orally once a day (at bedtime)  senna leaf extract oral tablet: 2 tab(s) orally once a day (at bedtime)

## 2025-07-07 NOTE — DISCHARGE NOTE PROVIDER - ATTENDING DISCHARGE PHYSICAL EXAMINATION:
Physical Exam: GENERAL: NAD  HEAD:  Atraumatic, normocephalic  EYES: EOMI, PERRL  NECK: Supple, trachea midline, no JVD  HEART: Regular rate and rhythm  LUNGS: Unlabored respirations. Clear to auscultation bilaterally, no crackles, wheezing, or rhonchi  ABDOMEN: Soft, nontender, nondistended, +BS  EXTREMITIES: 2+ peripheral pulses bilaterally. LLE ttp, LROM  NERVOUS SYSTEM:  A&Ox1, no gross focal deficits

## 2025-07-07 NOTE — DISCHARGE NOTE PROVIDER - NSDCCPTREATMENT_GEN_ALL_CORE_FT
PRINCIPAL PROCEDURE  Procedure: Intramedullary rodding of left hip using locking nail with lag screw in femoral neck with distal locking screw  Findings and Treatment:

## 2025-07-07 NOTE — PROGRESS NOTE ADULT - SUBJECTIVE AND OBJECTIVE BOX
Post Op Check    Pt seen and examined at bedside. No acute events since the surgery. Pain controlled, denies any numbness or tingling. Denies nausea, vomiting, chest pain, or shortness of breath.         LABS:                        9.7    7.55  )-----------( 187      ( 07 Jul 2025 14:55 )             29.4     07-07    139  |  108  |  32[H]  ----------------------------<  108[H]  4.0   |  23  |  1.07    Ca    8.9      07 Jul 2025 14:55    TPro  7.1  /  Alb  2.9[L]  /  TBili  0.7  /  DBili  x   /  AST  22  /  ALT  19  /  AlkPhos  113  07-06    PT/INR - ( 07 Jul 2025 01:45 )   PT: 12.0 sec;   INR: 1.06 ratio         PTT - ( 07 Jul 2025 01:45 )  PTT:30.8 sec  Urinalysis Basic - ( 07 Jul 2025 14:55 )    Color: x / Appearance: x / SG: x / pH: x  Gluc: 108 mg/dL / Ketone: x  / Bili: x / Urobili: x   Blood: x / Protein: x / Nitrite: x   Leuk Esterase: x / RBC: x / WBC x   Sq Epi: x / Non Sq Epi: x / Bacteria: x        VITAL SIGNS:  T(C): 36.3 (07-07-25 @ 15:57), Max: 36.8 (07-07-25 @ 11:19)  HR: 60 (07-07-25 @ 15:57) (59 - 94)  BP: 159/72 (07-07-25 @ 15:57) (114/63 - 163/94)  RR: 16 (07-07-25 @ 15:57) (12 - 18)  SpO2: 96% (07-07-25 @ 15:57) (96% - 100%)        EXAM:  Gen: NAD    Left Lower Extremity:  Dressing clean/dry/intact  Soft compartments  Negative calf ttp  +EHL/FHL/TA/GSc  SILT SPN, DPN, Tib, Saph, Bart  DP+           A/P:  89yFemale sp L IMN POD 0, doing well postoperatively    - WBAT  - PT/OT  - Pain control as needed  - Ice as needed  - DVT ppx: home A81 and lovenox POD 1  - Perioperative abx x 24 hours  - Encourage incentive spirometry  - Dispo: pending PT recs        Discussed with Dr. Orozco and will update with any further recommendations

## 2025-07-07 NOTE — PHYSICAL THERAPY INITIAL EVALUATION ADULT - PERTINENT HX OF CURRENT PROBLEM, REHAB EVAL
Pt is a 89 year old female sp L IMN POD 0 due to fall. Pt is a 89 year old female s/p L IMN POD 0 due to fall.

## 2025-07-07 NOTE — PHYSICAL THERAPY INITIAL EVALUATION ADULT - GENERAL OBSERVATIONS, REHAB EVAL
Pt encountered supine in bed, + B SCD, + primafit, + IV and in no acute distress. Pt agreed to PT eval. Pt encountered supine in bed, + B/l SCD, + primafit, + IV and in no acute distress. Pt agreed to PT eval and LUANNE Campbell aware.

## 2025-07-07 NOTE — PROGRESS NOTE ADULT - SUBJECTIVE AND OBJECTIVE BOX
Patient seen and examined at bedside. Pain well controlled with medication. Patient denies any numbness, tingling, weakness, or any other orthopaedic complaint. Denies N/V/CP/SOB.    LABS:                        10.8   8.06  )-----------( 208      ( 07 Jul 2025 01:45 )             32.9     07-07    137  |  104  |  36[H]  ----------------------------<  96  3.9   |  27  |  1.23    Ca    9.2      07 Jul 2025 01:45    TPro  7.1  /  Alb  2.9[L]  /  TBili  0.7  /  DBili  x   /  AST  22  /  ALT  19  /  AlkPhos  113  07-06    PT/INR - ( 07 Jul 2025 01:45 )   PT: 12.0 sec;   INR: 1.06 ratio    PTT - ( 07 Jul 2025 01:45 )  PTT:30.8 sec    Urinalysis Basic - ( 07 Jul 2025 01:45 )  Color: x / Appearance: x / SG: x / pH: x  Gluc: 96 mg/dL / Ketone: x  / Bili: x / Urobili: x   Blood: x / Protein: x / Nitrite: x   Leuk Esterase: x / RBC: x / WBC x   Sq Epi: x / Non Sq Epi: x / Bacteria: x    VITAL SIGNS:  T(C): 36.4 (07-07-25 @ 05:13), Max: 36.7 (07-06-25 @ 08:45)  HR: 87 (07-07-25 @ 05:13) (77 - 94)  BP: 145/73 (07-07-25 @ 05:13) (116/57 - 152/82)  RR: 18 (07-07-25 @ 05:13) (16 - 19)  SpO2: 98% (07-07-25 @ 05:13) (97% - 100%)    EXAM:  Gen: NAD    Left Lower Extremity:  Soft compartments  Negative calf ttp  +EHL/FHL/TA/GSc  SILT SPN, DPN, Tib, Saph, Bart  DP+     A/P: 89yFemale w/ L IT fx    - NWB LLE  - Pain meds as needed  - DVT ppx - SCDs only  - At this time, plan for OR today with Dr. Lucas for IMN  - NPO except for meds  - Please document medical and any other necessary optimization prior to the OR  - Appreciate medical management    Discussed with Dr. Lucas and will update with any further recommendations

## 2025-07-07 NOTE — BRIEF OPERATIVE NOTE - NSICDXBRIEFPROCEDURE_GEN_ALL_CORE_FT
PROCEDURES:  Intramedullary rodding of left hip using locking nail with lag screw in femoral neck with distal locking screw 07-Jul-2025 15:04:00  Bernardo Voss

## 2025-07-07 NOTE — DISCHARGE NOTE PROVIDER - NSDCFUADDINST_GEN_ALL_CORE_FT
1. ACTIVITY: Ambulate as tolerated with assistive devices as needed. Daily PT.  2. CALL FOR: fever over 101, wound redness, drainage or open area, calf pain/calf swelling.  3. BANDAGE: Change dressing to a new bandage on postop day 7. May change sooner if dressing saturated or falling off. DO NOT REMOVE BANDAGE TO CHECK WOUND IF CLEAN, DRY AND INTACT.  4. STAPLES: RN to remove staples on postop day 14.  5. SHOWER: Okay to shower. Do not scrub dressing or submerge under water. No baths or hot tubs.   6. DVT PE Prophylaxis: Please refer to primary medical team recommendations  7. FOLLOW UP: Dr. Orozco in 10-14 days. Call to schedule.

## 2025-07-07 NOTE — PRE-OP CHECKLIST - DNR CLARIFICATION FORM COMPLETED
Patient's sister states patient has MOLST form, not available now wish to sign a form over the phone

## 2025-07-07 NOTE — PROGRESS NOTE ADULT - SUBJECTIVE AND OBJECTIVE BOX
PROGRESS NOTE:     Patient is a 89y old  Female who presents with a chief complaint of Hip fracture (06 Jul 2025 06:49)      SUBJECTIVE / OVERNIGHT EVENTS:No acute events since admission         T(C): 36.4 (07-07-25 @ 15:20), Max: 36.8 (07-07-25 @ 11:19)  HR: 64 (07-07-25 @ 15:20) (59 - 87)  BP: 146/69 (07-07-25 @ 15:20) (114/63 - 163/94)  RR: 12 (07-07-25 @ 15:20) (12 - 18)  SpO2: 98% (07-07-25 @ 15:20) (96% - 100%)    MEDICATIONS  (STANDING):  ceFAZolin   IVPB 2000 milliGRAM(s) IV Intermittent every 8 hours    MEDICATIONS  (PRN):  fentaNYL    Injectable 25 MICROGram(s) IV Push every 5 minutes PRN Moderate Pain (4 - 6)  HYDROmorphone  Injectable 0.5 milliGRAM(s) IV Push every 10 minutes PRN Severe Pain (7 - 10)  ondansetron Injectable 4 milliGRAM(s) IV Push once PRN Nausea and/or Vomiting      Physical Exam: GENERAL: NAD  HEAD:  Atraumatic, normocephalic  EYES: EOMI, PERRL  NECK: Supple, trachea midline, no JVD  HEART: Regular rate and rhythm  LUNGS: Unlabored respirations. Clear to auscultation bilaterally, no crackles, wheezing, or rhonchi  ABDOMEN: Soft, nontender, nondistended, +BS  EXTREMITIES: 2+ peripheral pulses bilaterally. LLE ttp, LROM  NERVOUS SYSTEM:  A&Ox1, no gross focal deficits        LABS:                                       9.7    7.55  )-----------( 187      ( 07 Jul 2025 14:55 )             29.4           LIVER FUNCTIONS - ( 06 Jul 2025 08:00 )  Alb: 2.9 g/dL / Pro: 7.1 gm/dL / ALK PHOS: 113 U/L / ALT: 19 U/L / AST: 22 U/L / GGT: x           PT/INR - ( 07 Jul 2025 01:45 )   PT: 12.0 sec;   INR: 1.06 ratio         PTT - ( 07 Jul 2025 01:45 )  PTT:30.8 sec  139|108|32<108  4.0|23|1.07  8.9,--,--  07-07 @ 14:55  139|106|35<101  4.0|27|1.05  9.7,--,--  07-07 @ 07:50  137|104|36<96  3.9|27|1.23  9.2,--,--  07-07 @ 01:45      RADIOLOGY & ADDITIONAL TESTS:  Results Reviewed:   Imaging Personally Reviewed:  Electrocardiogram Personally Reviewed:    COORDINATION OF CARE:  Care Discussed with Consultants/Other Providers [Y/N]:  Prior or Outpatient Records Reviewed [Y/N]:

## 2025-07-07 NOTE — PHYSICAL THERAPY INITIAL EVALUATION ADULT - BED MOBILITY TRAINING, PT EVAL
Pt will be able to perform bed mobility with contact guard in. Pt will be able to perform bed mobility with contact guard.

## 2025-07-07 NOTE — PHYSICAL THERAPY INITIAL EVALUATION ADULT - ADDITIONAL COMMENTS
Pt is a poor historian, attempted to contact emergency contact with phone listed in EMR however no answer. As per H&P pt at baseline walks with a walker. As per last PT cesaral listed in EMR on 2/15/2021 "As per pt, she lives alone in a house, 4 steps to enter with both rails; states to have home health aide 24/7. Ambulates with rolling walker."

## 2025-07-07 NOTE — DISCHARGE NOTE PROVIDER - HOSPITAL COURSE
89-year-old female with past medical history of dementia, CKD, hypertension, dysphagia, TIA, osteoarthritis who presented to the ED after a fall.  XR LLE: left IT fx. orthopedic surgery was consulted; recommends medicine admission 2/2 dementia s/p IMN pinning stable for discharge to rehab.       Problem/Plan - 1:  ·  Problem: Hip fracture, left.   ·  Plan: - imaging reviewed as above  - Orthopedic surgery recommended medicine admission  s/p ORIF S/P left IMN POD  - Pain control with tylenol and morphine.     Problem/Plan - 2:  ·  Problem: Fall.   ·  Plan: - fell out of bed s per home aide as per chart review   - unable to obtain collateral info att   - fall precautions.     Problem/Plan - 3:  ·  Problem: Dementia.   ·  Plan: - unclear baseline, AAOx1-2 at this time  - Feeding assistance  - fall precautions  - aspiration precautions.     Problem/Plan - 4:  ·  Problem: CKD (chronic kidney disease).   ·  Plan: - Cr wnl   - Avoid nephrotoxin  - i/os  - f/u am labs, monitor electrolyte closely and replete as needed.     Problem/Plan - 5:  ·  Problem: HTN (hypertension).   ·  Plan: stable BP   unknown home meds, f/u in am.     Problem/Plan - 6:  ·  Problem: Prophylactic measure.   ·  Plan: - DVT ppx: hold for procedure   - GI ppx: not required att  - Reassess need daily.     Problem/Plan - 7:  ·  Problem: Preoperative examination.   -please see admission note for preoperative assessment.

## 2025-07-07 NOTE — DISCHARGE NOTE PROVIDER - CARE PROVIDER_API CALL
Quinn Orozco  Orthopaedic Surgery  [address] [facility] [address], [address]  [place] [address]  Phone: [phone]  Fax: [phone]  Follow Up Time:

## 2025-07-07 NOTE — DISCHARGE NOTE PROVIDER - DETAILS OF MALNUTRITION DIAGNOSIS/DIAGNOSES
This patient has been assessed with a concern for Malnutrition and was treated during this hospitalization for the following Nutrition diagnosis/diagnoses:     -  07/08/2025: Severe protein-calorie malnutrition

## 2025-07-07 NOTE — PHYSICAL THERAPY INITIAL EVALUATION ADULT - ACTIVE RANGE OF MOTION EXAMINATION, REHAB EVAL
L Hip flexion: 70 degrees, extension: -10 degrees/Right LE Active ROM was WFL (within functional limits)/deficits as listed below

## 2025-07-08 LAB
ANION GAP SERPL CALC-SCNC: 7 MMOL/L — SIGNIFICANT CHANGE UP (ref 5–17)
BUN SERPL-MCNC: 35 MG/DL — HIGH (ref 7–23)
CALCIUM SERPL-MCNC: 8.2 MG/DL — LOW (ref 8.5–10.1)
CHLORIDE SERPL-SCNC: 107 MMOL/L — SIGNIFICANT CHANGE UP (ref 96–108)
CO2 SERPL-SCNC: 24 MMOL/L — SIGNIFICANT CHANGE UP (ref 22–31)
CREAT SERPL-MCNC: 1.14 MG/DL — SIGNIFICANT CHANGE UP (ref 0.5–1.3)
EGFR: 45 ML/MIN/1.73M2 — LOW
EGFR: 45 ML/MIN/1.73M2 — LOW
GLUCOSE SERPL-MCNC: 108 MG/DL — HIGH (ref 70–99)
HCT VFR BLD CALC: 25.9 % — LOW (ref 34.5–45)
HGB BLD-MCNC: 8.3 G/DL — LOW (ref 11.5–15.5)
MCHC RBC-ENTMCNC: 31.6 PG — SIGNIFICANT CHANGE UP (ref 27–34)
MCHC RBC-ENTMCNC: 32 G/DL — SIGNIFICANT CHANGE UP (ref 32–36)
MCV RBC AUTO: 98.5 FL — SIGNIFICANT CHANGE UP (ref 80–100)
NRBC BLD AUTO-RTO: 0 /100 WBCS — SIGNIFICANT CHANGE UP (ref 0–0)
PLATELET # BLD AUTO: 180 K/UL — SIGNIFICANT CHANGE UP (ref 150–400)
POTASSIUM SERPL-MCNC: 4.1 MMOL/L — SIGNIFICANT CHANGE UP (ref 3.5–5.3)
POTASSIUM SERPL-SCNC: 4.1 MMOL/L — SIGNIFICANT CHANGE UP (ref 3.5–5.3)
RBC # BLD: 2.63 M/UL — LOW (ref 3.8–5.2)
RBC # FLD: 13.7 % — SIGNIFICANT CHANGE UP (ref 10.3–14.5)
SODIUM SERPL-SCNC: 138 MMOL/L — SIGNIFICANT CHANGE UP (ref 135–145)
WBC # BLD: 9.21 K/UL — SIGNIFICANT CHANGE UP (ref 3.8–10.5)
WBC # FLD AUTO: 9.21 K/UL — SIGNIFICANT CHANGE UP (ref 3.8–10.5)

## 2025-07-08 RX ADMIN — Medication 75 MILLILITER(S): at 05:07

## 2025-07-08 RX ADMIN — Medication 2 TABLET(S): at 22:48

## 2025-07-08 RX ADMIN — Medication 975 MILLIGRAM(S): at 05:05

## 2025-07-08 RX ADMIN — Medication 975 MILLIGRAM(S): at 14:54

## 2025-07-08 RX ADMIN — Medication 975 MILLIGRAM(S): at 13:54

## 2025-07-08 RX ADMIN — Medication 100 MILLIGRAM(S): at 05:04

## 2025-07-08 RX ADMIN — Medication 40 MILLIGRAM(S): at 06:02

## 2025-07-08 RX ADMIN — Medication 81 MILLIGRAM(S): at 12:04

## 2025-07-08 RX ADMIN — POLYETHYLENE GLYCOL 3350 17 GRAM(S): 17 POWDER, FOR SOLUTION ORAL at 22:48

## 2025-07-08 RX ADMIN — Medication 975 MILLIGRAM(S): at 06:06

## 2025-07-08 RX ADMIN — ENOXAPARIN SODIUM 40 MILLIGRAM(S): 100 INJECTION SUBCUTANEOUS at 05:09

## 2025-07-08 RX ADMIN — Medication 975 MILLIGRAM(S): at 23:48

## 2025-07-08 RX ADMIN — Medication 1 TABLET(S): at 12:04

## 2025-07-08 RX ADMIN — Medication 975 MILLIGRAM(S): at 22:48

## 2025-07-08 NOTE — PROGRESS NOTE ADULT - SUBJECTIVE AND OBJECTIVE BOX
Pt seen and examined at bedside. No acute events overnight. Pain controlled, denies any numbness or tingling. Denies nausea, vomiting, chest pain, or shortness of breath.         LABS:                        9.7    7.55  )-----------( 187      ( 07 Jul 2025 14:55 )             29.4     07-07    139  |  108  |  32[H]  ----------------------------<  108[H]  4.0   |  23  |  1.07    Ca    8.9      07 Jul 2025 14:55    TPro  7.1  /  Alb  2.9[L]  /  TBili  0.7  /  DBili  x   /  AST  22  /  ALT  19  /  AlkPhos  113  07-06    PT/INR - ( 07 Jul 2025 01:45 )   PT: 12.0 sec;   INR: 1.06 ratio         PTT - ( 07 Jul 2025 01:45 )  PTT:30.8 sec  Urinalysis Basic - ( 07 Jul 2025 14:55 )    Color: x / Appearance: x / SG: x / pH: x  Gluc: 108 mg/dL / Ketone: x  / Bili: x / Urobili: x   Blood: x / Protein: x / Nitrite: x   Leuk Esterase: x / RBC: x / WBC x   Sq Epi: x / Non Sq Epi: x / Bacteria: x        VITAL SIGNS:  T(C): 36.3 (07-08-25 @ 02:56), Max: 36.8 (07-07-25 @ 11:19)  HR: 68 (07-08-25 @ 02:56) (59 - 87)  BP: 115/63 (07-08-25 @ 02:56) (100/60 - 163/94)  RR: 19 (07-08-25 @ 02:56) (12 - 19)  SpO2: 95% (07-08-25 @ 02:56) (94% - 100%)        EXAM:  Gen: NAD    Left Lower Extremity:  Dressing clean/dry/intact  Soft compartments  Negative calf ttp  +EHL/FHL/TA/GSc  SILT SPN, DPN, Tib, Saph, Bart  DP+           A/P:  89yFemale sp L IMN POD 1    - WBAT  - PT/OT  - Pain control as needed  - Ice as needed  - DVT ppx: home A81 and lovenox today  - Encourage incentive spirometry  - Dispo: [facility] per PT recs        Discussed with Dr. Orozco and will update with any further recommendations Pt seen and examined at bedside. No acute events overnight. Pain controlled, denies any numbness or tingling. Denies nausea, vomiting, chest pain, or shortness of breath.         LABS:                        9.7    7.55  )-----------( 187      ( 07 Jul 2025 14:55 )             29.4     07-07    139  |  108  |  32[H]  ----------------------------<  108[H]  4.0   |  23  |  1.07    Ca    8.9      07 Jul 2025 14:55    TPro  7.1  /  Alb  2.9[L]  /  TBili  0.7  /  DBili  x   /  AST  22  /  ALT  19  /  AlkPhos  113  07-06    PT/INR - ( 07 Jul 2025 01:45 )   PT: 12.0 sec;   INR: 1.06 ratio         PTT - ( 07 Jul 2025 01:45 )  PTT:30.8 sec  Urinalysis Basic - ( 07 Jul 2025 14:55 )    Color: x / Appearance: x / SG: x / pH: x  Gluc: 108 mg/dL / Ketone: x  / Bili: x / Urobili: x   Blood: x / Protein: x / Nitrite: x   Leuk Esterase: x / RBC: x / WBC x   Sq Epi: x / Non Sq Epi: x / Bacteria: x        VITAL SIGNS:  T(C): 36.3 (07-08-25 @ 02:56), Max: 36.8 (07-07-25 @ 11:19)  HR: 68 (07-08-25 @ 02:56) (59 - 87)  BP: 115/63 (07-08-25 @ 02:56) (100/60 - 163/94)  RR: 19 (07-08-25 @ 02:56) (12 - 19)  SpO2: 95% (07-08-25 @ 02:56) (94% - 100%)        EXAM:  Gen: NAD    Left Lower Extremity:  Distal dressing with minimal strikethrough otherwise dressing clean/dry/intact  Soft compartments  Negative calf ttp  +EHL/FHL/TA/GSc  SILT SPN, DPN, Tib, Saph, Bart  DP+           A/P:  89yFemale sp L IMN POD 1    - WBAT  - PT/OT  - Pain control as needed  - Ice as needed  - DVT ppx: home A81 and lovenox today  - Encourage incentive spirometry  - Dispo: [facility] per PT recs        Discussed with Dr. Orozco and will update with any further recommendations

## 2025-07-08 NOTE — DIETITIAN INITIAL EVALUATION ADULT - NS FNS DIET ORDER
Detail Level: Simple Detail Level: Detailed Diet, Full Liquid (07-07-25 @ 17:36)  Diet, Regular:   DASH/TLC {Sodium & Cholesterol Restricted} (07-07-25 @ 14:22)  [employer]-vegetarian preference

## 2025-07-08 NOTE — DIETITIAN NUTRITION RISK NOTIFICATION - TREATMENT: THE FOLLOWING DIET HAS BEEN RECOMMENDED
Diet, Easy to Chew:   [employer] Vegetarian (Accepts Fish)  Supplement Feeding Modality:  Oral  Ensure Plus High Protein Cans or Servings Per Day:  1       Frequency:  Two Times a day (07-08-25 @ 13:22) [Pending Verification By Attending]  Diet, Regular:   DASH/TLC {Sodium & Cholesterol Restricted}  [employer] Vegetarian (Accepts Fish) (07-08-25 @ 12:51) [Active]  Diet, Regular:   DASH/TLC {Sodium & Cholesterol Restricted} (07-07-25 @ 14:22) [Available for Activation]

## 2025-07-08 NOTE — DIETITIAN INITIAL EVALUATION ADULT - PERTINENT MEDS FT
MEDICATIONS  (STANDING):  acetaminophen     Tablet .. 975 milliGRAM(s) Oral every 8 hours  acetaminophen   IVPB .. 1000 milliGRAM(s) IV Intermittent once  aspirin  chewable 81 milliGRAM(s) Oral daily  enoxaparin Injectable 40 milliGRAM(s) SubCutaneous every 24 hours  multivitamin 1 Tablet(s) Oral daily  pantoprazole    Tablet 40 milliGRAM(s) Oral before breakfast  polyethylene glycol 3350 17 Gram(s) Oral at bedtime  senna 2 Tablet(s) Oral at bedtime  sodium chloride 0.9%. 1000 milliLiter(s) (75 mL/Hr) IV Continuous <Continuous>    MEDICATIONS  (PRN):  magnesium hydroxide Suspension 30 milliLiter(s) Oral daily PRN Constipation  ondansetron Injectable 4 milliGRAM(s) IV Push every 6 hours PRN Nausea and/or Vomiting  oxyCODONE    IR 2.5 milliGRAM(s) Oral every 3 hours PRN Moderate Pain (4 - 6)  oxyCODONE    IR 5 milliGRAM(s) Oral every 3 hours PRN Severe Pain (7 - 10)  traMADol 25 milliGRAM(s) Oral every 6 hours PRN Mild Pain (1 - 3)

## 2025-07-08 NOTE — DIETITIAN INITIAL EVALUATION ADULT - PERTINENT LABORATORY DATA
07-08    138  |  107  |  35[H]  ----------------------------<  108[H]  4.1   |  24  |  1.14    Ca    8.2[L]      08 Jul 2025 06:50

## 2025-07-08 NOTE — DIETITIAN INITIAL EVALUATION ADULT - OTHER INFO
Unable to interview pt due to dementia. Per medical record pt lives alone; has [facility] assistance & supportive sister & brother.  Pt presented s/p fall at home; [facility]HA reports pt fell out of bed; now s/p IMN of left hip (7/5).  No reports of any N/V/C/D or chew/swallowing difficulty; requires some feeding assistance; will change to Easy to Chew consistency for ease of eating. Discharge plan is for [facility] when pt is medically stable.

## 2025-07-08 NOTE — PROGRESS NOTE ADULT - SUBJECTIVE AND OBJECTIVE BOX
PROGRESS NOTE:     Patient is a 89y old  Female who presents with a chief complaint of Hip fracture (06 Jul 2025 06:49)      SUBJECTIVE / OVERNIGHT EVENTS:No acute events since admission     T(C): 36.7 (07-08-25 @ 11:05), Max: 36.7 (07-08-25 @ 06:57)  HR: 69 (07-08-25 @ 11:05) (67 - 69)  BP: 125/69 (07-08-25 @ 11:05) (115/63 - 155/73)  RR: 19 (07-08-25 @ 11:05) (18 - 19)  SpO2: 98% (07-08-25 @ 11:05) (95% - 98%)      MEDICATIONS  (STANDING):  acetaminophen     Tablet .. 975 milliGRAM(s) Oral every 8 hours  acetaminophen   IVPB .. 1000 milliGRAM(s) IV Intermittent once  aspirin  chewable 81 milliGRAM(s) Oral daily  enoxaparin Injectable 40 milliGRAM(s) SubCutaneous every 24 hours  multivitamin 1 Tablet(s) Oral daily  pantoprazole    Tablet 40 milliGRAM(s) Oral before breakfast  polyethylene glycol 3350 17 Gram(s) Oral at bedtime  senna 2 Tablet(s) Oral at bedtime  sodium chloride 0.9%. 1000 milliLiter(s) (75 mL/Hr) IV Continuous <Continuous>    MEDICATIONS  (PRN):  magnesium hydroxide Suspension 30 milliLiter(s) Oral daily PRN Constipation  ondansetron Injectable 4 milliGRAM(s) IV Push every 6 hours PRN Nausea and/or Vomiting  oxyCODONE    IR 2.5 milliGRAM(s) Oral every 3 hours PRN Moderate Pain (4 - 6)  oxyCODONE    IR 5 milliGRAM(s) Oral every 3 hours PRN Severe Pain (7 - 10)  traMADol 25 milliGRAM(s) Oral every 6 hours PRN Mild Pain (1 - 3)    Physical Exam: GENERAL: NAD  HEAD:  Atraumatic, normocephalic  EYES: EOMI, PERRL  NECK: Supple, trachea midline, no JVD  HEART: Regular rate and rhythm  LUNGS: Unlabored respirations. Clear to auscultation bilaterally, no crackles, wheezing, or rhonchi  ABDOMEN: Soft, nontender, nondistended, +BS  EXTREMITIES: 2+ peripheral pulses bilaterally. LLE ttp, LROM  NERVOUS SYSTEM:  A&Ox1, no gross focal deficits                          8.3    9.21  )-----------( 180      ( 08 Jul 2025 06:50 )             25.9             PT/INR - ( 07 Jul 2025 01:45 )   PT: 12.0 sec;   INR: 1.06 ratio         PTT - ( 07 Jul 2025 01:45 )  PTT:30.8 sec  138|107|35<108  4.1|24|1.14  8.2,--,--  07-08 @ 06:50  139|108|32<108  4.0|23|1.07  8.9,--,--  07-07 @ 14:55

## 2025-07-08 NOTE — DIETITIAN INITIAL EVALUATION ADULT - NSICDXPASTMEDICALHX_GEN_ALL_CORE_FT
PAST MEDICAL HISTORY:  Arthritis     Brain TIA     CKD (chronic kidney disease)     Dementia     Dislocated shoulder, right, initial encounter     Dysphagia     HTN (hypertension)

## 2025-07-08 NOTE — OCCUPATIONAL THERAPY INITIAL EVALUATION ADULT - ADDITIONAL COMMENTS
As per H&P pt at baseline walks with a walker. As per last PT cesaral listed in EMR on 2/15/2021 "As per pt, she lives alone in a house, 4 steps to enter with both rails; states to have home health aide 24/7. Ambulates with rolling walker."

## 2025-07-09 LAB
ANION GAP SERPL CALC-SCNC: 6 MMOL/L — SIGNIFICANT CHANGE UP (ref 5–17)
BUN SERPL-MCNC: 31 MG/DL — HIGH (ref 7–23)
CALCIUM SERPL-MCNC: 8.4 MG/DL — LOW (ref 8.5–10.1)
CHLORIDE SERPL-SCNC: 108 MMOL/L — SIGNIFICANT CHANGE UP (ref 96–108)
CO2 SERPL-SCNC: 25 MMOL/L — SIGNIFICANT CHANGE UP (ref 22–31)
CREAT SERPL-MCNC: 1.04 MG/DL — SIGNIFICANT CHANGE UP (ref 0.5–1.3)
EGFR: 50 ML/MIN/1.73M2 — LOW
EGFR: 50 ML/MIN/1.73M2 — LOW
GLUCOSE SERPL-MCNC: 89 MG/DL — SIGNIFICANT CHANGE UP (ref 70–99)
HCT VFR BLD CALC: 27.3 % — LOW (ref 34.5–45)
HGB BLD-MCNC: 8.7 G/DL — LOW (ref 11.5–15.5)
MCHC RBC-ENTMCNC: 31.6 PG — SIGNIFICANT CHANGE UP (ref 27–34)
MCHC RBC-ENTMCNC: 31.9 G/DL — LOW (ref 32–36)
MCV RBC AUTO: 99.3 FL — SIGNIFICANT CHANGE UP (ref 80–100)
NRBC BLD AUTO-RTO: 0 /100 WBCS — SIGNIFICANT CHANGE UP (ref 0–0)
PLATELET # BLD AUTO: 218 K/UL — SIGNIFICANT CHANGE UP (ref 150–400)
POTASSIUM SERPL-MCNC: 4.1 MMOL/L — SIGNIFICANT CHANGE UP (ref 3.5–5.3)
POTASSIUM SERPL-SCNC: 4.1 MMOL/L — SIGNIFICANT CHANGE UP (ref 3.5–5.3)
RBC # BLD: 2.75 M/UL — LOW (ref 3.8–5.2)
RBC # FLD: 14 % — SIGNIFICANT CHANGE UP (ref 10.3–14.5)
SODIUM SERPL-SCNC: 139 MMOL/L — SIGNIFICANT CHANGE UP (ref 135–145)
WBC # BLD: 8.58 K/UL — SIGNIFICANT CHANGE UP (ref 3.8–10.5)
WBC # FLD AUTO: 8.58 K/UL — SIGNIFICANT CHANGE UP (ref 3.8–10.5)

## 2025-07-09 PROCEDURE — 99232 SBSQ HOSP IP/OBS MODERATE 35: CPT

## 2025-07-09 RX ADMIN — Medication 2 TABLET(S): at 22:55

## 2025-07-09 RX ADMIN — MAGNESIUM HYDROXIDE 30 MILLILITER(S): 400 SUSPENSION ORAL at 07:52

## 2025-07-09 RX ADMIN — OXYCODONE HYDROCHLORIDE 5 MILLIGRAM(S): 30 TABLET ORAL at 08:52

## 2025-07-09 RX ADMIN — Medication 81 MILLIGRAM(S): at 13:42

## 2025-07-09 RX ADMIN — Medication 975 MILLIGRAM(S): at 06:13

## 2025-07-09 RX ADMIN — Medication 975 MILLIGRAM(S): at 23:55

## 2025-07-09 RX ADMIN — Medication 40 MILLIGRAM(S): at 07:44

## 2025-07-09 RX ADMIN — POLYETHYLENE GLYCOL 3350 17 GRAM(S): 17 POWDER, FOR SOLUTION ORAL at 22:55

## 2025-07-09 RX ADMIN — Medication 975 MILLIGRAM(S): at 22:55

## 2025-07-09 RX ADMIN — OXYCODONE HYDROCHLORIDE 5 MILLIGRAM(S): 30 TABLET ORAL at 07:52

## 2025-07-09 RX ADMIN — Medication 975 MILLIGRAM(S): at 14:41

## 2025-07-09 RX ADMIN — Medication 1 TABLET(S): at 13:42

## 2025-07-09 RX ADMIN — Medication 975 MILLIGRAM(S): at 13:41

## 2025-07-09 RX ADMIN — Medication 975 MILLIGRAM(S): at 07:13

## 2025-07-09 RX ADMIN — ENOXAPARIN SODIUM 40 MILLIGRAM(S): 100 INJECTION SUBCUTANEOUS at 06:13

## 2025-07-09 NOTE — PROGRESS NOTE ADULT - SUBJECTIVE AND OBJECTIVE BOX
Patient is a 89y old  Female who presents with a chief complaint of Hip fracture (09 Jul 2025 05:16)    INTERVAL HPI/OVERNIGHT EVENTS: Patients seen and examined at bedside this morning. No acute events overnight.  MEDICATIONS  (STANDING):  acetaminophen     Tablet .. 975 milliGRAM(s) Oral every 8 hours  acetaminophen   IVPB .. 1000 milliGRAM(s) IV Intermittent once  aspirin  chewable 81 milliGRAM(s) Oral daily  enoxaparin Injectable 40 milliGRAM(s) SubCutaneous every 24 hours  multivitamin 1 Tablet(s) Oral daily  pantoprazole    Tablet 40 milliGRAM(s) Oral before breakfast  polyethylene glycol 3350 17 Gram(s) Oral at bedtime  senna 2 Tablet(s) Oral at bedtime  sodium chloride 0.9%. 1000 milliLiter(s) (75 mL/Hr) IV Continuous <Continuous>    MEDICATIONS  (PRN):  magnesium hydroxide Suspension 30 milliLiter(s) Oral daily PRN Constipation  ondansetron Injectable 4 milliGRAM(s) IV Push every 6 hours PRN Nausea and/or Vomiting  oxyCODONE    IR 2.5 milliGRAM(s) Oral every 3 hours PRN Moderate Pain (4 - 6)  oxyCODONE    IR 5 milliGRAM(s) Oral every 3 hours PRN Severe Pain (7 - 10)  traMADol 25 milliGRAM(s) Oral every 6 hours PRN Mild Pain (1 - 3)    Allergies    No Known Allergies    Intolerances      REVIEW OF SYSTEMS:  All other systems reviewed and are negative    Vital Signs Last 24 Hrs  T(C): 36.7 (09 Jul 2025 16:39), Max: 36.7 (08 Jul 2025 17:13)  T(F): 98 (09 Jul 2025 16:39), Max: 98.1 (08 Jul 2025 17:13)  HR: 85 (09 Jul 2025 16:39) (76 - 105)  BP: 151/87 (09 Jul 2025 16:39) (111/83 - 177/83)  BP(mean): --  RR: 19 (09 Jul 2025 16:39) (18 - 19)  SpO2: 94% (09 Jul 2025 16:39) (94% - 98%)    Parameters below as of 09 Jul 2025 16:39  Patient On (Oxygen Delivery Method): room air      Daily     Daily   I&O's Summary    08 Jul 2025 07:01 - 09 Jul 2025 07:00  --------------------------------------------------------  IN: 600 mL / OUT: 1400 mL / NET: -800 mL    09 Jul 2025 07:01  -  09 Jul 2025 17:10  --------------------------------------------------------  IN: 300 mL / OUT: 0 mL / NET: 300 mL      CAPILLARY BLOOD GLUCOSE          Physical Exam: GENERAL: NAD  HEAD:  Atraumatic, normocephalic  EYES: EOMI, PERRL  NECK: Supple, trachea midline, no JVD  HEART: Regular rate and rhythm  LUNGS: Unlabored respirations. Clear to auscultation bilaterally, no crackles, wheezing, or rhonchi  ABDOMEN: Soft, nontender, nondistended, +BS  EXTREMITIES: 2+ peripheral pulses bilaterally. LLE ttp, LROM  NERVOUS SYSTEM:  A&Ox1, no gross focal deficits      Labs                          8.7    8.58  )-----------( 218      ( 09 Jul 2025 06:15 )             27.3     07-09    139  |  108  |  31[H]  ----------------------------<  89  4.1   |  25  |  1.04    Ca    8.4[L]      09 Jul 2025 06:15            Urinalysis Basic - ( 09 Jul 2025 06:15 )    Color: x / Appearance: x / SG: x / pH: x  Gluc: 89 mg/dL / Ketone: x  / Bili: x / Urobili: x   Blood: x / Protein: x / Nitrite: x   Leuk Esterase: x / RBC: x / WBC x   Sq Epi: x / Non Sq Epi: x / Bacteria: x                      Radiology and Imaging reviewed.

## 2025-07-09 NOTE — PROGRESS NOTE ADULT - SUBJECTIVE AND OBJECTIVE BOX
Pt seen and examined at bedside. No acute events overnight. Pain controlled, denies any numbness or tingling. Denies nausea, vomiting, chest pain, or shortness of breath.     LABS:                        8.3    9.21  )-----------( 180      ( 08 Jul 2025 06:50 )             25.9     07-08    138  |  107  |  35[H]  ----------------------------<  108[H]  4.1   |  24  |  1.14    Ca    8.2[L]      08 Jul 2025 06:50        Urinalysis Basic - ( 08 Jul 2025 06:50 )    Color: x / Appearance: x / SG: x / pH: x  Gluc: 108 mg/dL / Ketone: x  / Bili: x / Urobili: x   Blood: x / Protein: x / Nitrite: x   Leuk Esterase: x / RBC: x / WBC x   Sq Epi: x / Non Sq Epi: x / Bacteria: x        VITAL SIGNS:  T(C): 36.3 (07-09-25 @ 04:49), Max: 36.7 (07-08-25 @ 06:57)  HR: 82 (07-09-25 @ 04:49) (67 - 82)  BP: 111/83 (07-09-25 @ 04:49) (102/56 - 155/73)  RR: 19 (07-09-25 @ 04:49) (18 - 19)  SpO2: 94% (07-09-25 @ 04:49) (94% - 99%)      EXAM:  Gen: NAD  Left Lower Extremity:  Distal dressing with minimal strikethrough otherwise dressing clean/dry/intact  Motor: +EHL/FHL/TA/GSc  Sensory: SILT SPN, DPN, Tib, Saph, Bart  Soft compartments  NTTP of calf   DP+     A/P:  89yFemale POD 2 sp L IMN  - WBAT  - PT/OT: PT Rec [facility]  - Pain control as needed  - Ice as needed  - DVT ppx: home A81 and lovenox today  - Encourage incentive spirometry  - Dispo: [facility] per PT recs    Discussed with Dr. Orozco and will update with any further recommendations

## 2025-07-10 LAB
ANION GAP SERPL CALC-SCNC: 6 MMOL/L — SIGNIFICANT CHANGE UP (ref 5–17)
BUN SERPL-MCNC: 36 MG/DL — HIGH (ref 7–23)
CALCIUM SERPL-MCNC: 8.1 MG/DL — LOW (ref 8.5–10.1)
CHLORIDE SERPL-SCNC: 108 MMOL/L — SIGNIFICANT CHANGE UP (ref 96–108)
CO2 SERPL-SCNC: 24 MMOL/L — SIGNIFICANT CHANGE UP (ref 22–31)
CREAT SERPL-MCNC: 0.94 MG/DL — SIGNIFICANT CHANGE UP (ref 0.5–1.3)
EGFR: 56 ML/MIN/1.73M2 — LOW
EGFR: 56 ML/MIN/1.73M2 — LOW
GLUCOSE SERPL-MCNC: 81 MG/DL — SIGNIFICANT CHANGE UP (ref 70–99)
HCT VFR BLD CALC: 24.9 % — LOW (ref 34.5–45)
HGB BLD-MCNC: 8 G/DL — LOW (ref 11.5–15.5)
MCHC RBC-ENTMCNC: 31.4 PG — SIGNIFICANT CHANGE UP (ref 27–34)
MCHC RBC-ENTMCNC: 32.1 G/DL — SIGNIFICANT CHANGE UP (ref 32–36)
MCV RBC AUTO: 97.6 FL — SIGNIFICANT CHANGE UP (ref 80–100)
NRBC BLD AUTO-RTO: 0 /100 WBCS — SIGNIFICANT CHANGE UP (ref 0–0)
PLATELET # BLD AUTO: 214 K/UL — SIGNIFICANT CHANGE UP (ref 150–400)
POTASSIUM SERPL-MCNC: 4.3 MMOL/L — SIGNIFICANT CHANGE UP (ref 3.5–5.3)
POTASSIUM SERPL-SCNC: 4.3 MMOL/L — SIGNIFICANT CHANGE UP (ref 3.5–5.3)
RBC # BLD: 2.55 M/UL — LOW (ref 3.8–5.2)
RBC # FLD: 14 % — SIGNIFICANT CHANGE UP (ref 10.3–14.5)
SODIUM SERPL-SCNC: 138 MMOL/L — SIGNIFICANT CHANGE UP (ref 135–145)
WBC # BLD: 7.77 K/UL — SIGNIFICANT CHANGE UP (ref 3.8–10.5)
WBC # FLD AUTO: 7.77 K/UL — SIGNIFICANT CHANGE UP (ref 3.8–10.5)

## 2025-07-10 PROCEDURE — 99232 SBSQ HOSP IP/OBS MODERATE 35: CPT

## 2025-07-10 RX ORDER — BISACODYL 5 MG
10 TABLET, DELAYED RELEASE (ENTERIC COATED) ORAL ONCE
Refills: 0 | Status: COMPLETED | OUTPATIENT
Start: 2025-07-10 | End: 2025-07-11

## 2025-07-10 RX ADMIN — ENOXAPARIN SODIUM 40 MILLIGRAM(S): 100 INJECTION SUBCUTANEOUS at 05:34

## 2025-07-10 RX ADMIN — Medication 81 MILLIGRAM(S): at 13:00

## 2025-07-10 RX ADMIN — OXYCODONE HYDROCHLORIDE 5 MILLIGRAM(S): 30 TABLET ORAL at 19:18

## 2025-07-10 RX ADMIN — Medication 2 TABLET(S): at 21:20

## 2025-07-10 RX ADMIN — Medication 975 MILLIGRAM(S): at 22:17

## 2025-07-10 RX ADMIN — OXYCODONE HYDROCHLORIDE 5 MILLIGRAM(S): 30 TABLET ORAL at 12:59

## 2025-07-10 RX ADMIN — Medication 975 MILLIGRAM(S): at 06:34

## 2025-07-10 RX ADMIN — Medication 975 MILLIGRAM(S): at 21:16

## 2025-07-10 RX ADMIN — OXYCODONE HYDROCHLORIDE 5 MILLIGRAM(S): 30 TABLET ORAL at 13:59

## 2025-07-10 RX ADMIN — Medication 975 MILLIGRAM(S): at 05:34

## 2025-07-10 RX ADMIN — Medication 1 TABLET(S): at 13:00

## 2025-07-10 RX ADMIN — POLYETHYLENE GLYCOL 3350 17 GRAM(S): 17 POWDER, FOR SOLUTION ORAL at 21:16

## 2025-07-10 RX ADMIN — OXYCODONE HYDROCHLORIDE 5 MILLIGRAM(S): 30 TABLET ORAL at 18:48

## 2025-07-10 NOTE — PROGRESS NOTE ADULT - SUBJECTIVE AND OBJECTIVE BOX
Patient is a 89y old  Female who presents with a chief complaint of Hip fracture (10 Jul 2025 05:01)    INTERVAL HPI/OVERNIGHT EVENTS: Patients seen and examined at bedside this morning. No acute events overnight.    MEDICATIONS  (STANDING):  acetaminophen     Tablet .. 975 milliGRAM(s) Oral every 8 hours  acetaminophen   IVPB .. 1000 milliGRAM(s) IV Intermittent once  aspirin  chewable 81 milliGRAM(s) Oral daily  enoxaparin Injectable 40 milliGRAM(s) SubCutaneous every 24 hours  multivitamin 1 Tablet(s) Oral daily  pantoprazole    Tablet 40 milliGRAM(s) Oral before breakfast  polyethylene glycol 3350 17 Gram(s) Oral at bedtime  senna 2 Tablet(s) Oral at bedtime  sodium chloride 0.9%. 1000 milliLiter(s) (75 mL/Hr) IV Continuous <Continuous>    MEDICATIONS  (PRN):  bisacodyl Suppository 10 milliGRAM(s) Rectal once PRN Constipation  magnesium hydroxide Suspension 30 milliLiter(s) Oral daily PRN Constipation  ondansetron Injectable 4 milliGRAM(s) IV Push every 6 hours PRN Nausea and/or Vomiting  oxyCODONE    IR 2.5 milliGRAM(s) Oral every 3 hours PRN Moderate Pain (4 - 6)  oxyCODONE    IR 5 milliGRAM(s) Oral every 3 hours PRN Severe Pain (7 - 10)  traMADol 25 milliGRAM(s) Oral every 6 hours PRN Mild Pain (1 - 3)    Allergies    No Known Allergies    Intolerances      REVIEW OF SYSTEMS:  All other systems reviewed and are negative    Vital Signs Last 24 Hrs  T(C): 36.3 (10 Jul 2025 11:10), Max: 37.1 (09 Jul 2025 23:45)  T(F): 97.3 (10 Jul 2025 11:10), Max: 98.8 (09 Jul 2025 23:45)  HR: 65 (10 Jul 2025 12:23) (65 - 85)  BP: 112/64 (10 Jul 2025 12:23) (109/62 - 151/87)  BP(mean): --  RR: 18 (10 Jul 2025 11:10) (18 - 19)  SpO2: 99% (10 Jul 2025 12:23) (94% - 100%)    Parameters below as of 10 Jul 2025 12:23  Patient On (Oxygen Delivery Method): room air      Daily     Daily   I&O's Summary    09 Jul 2025 07:01  -  10 Jul 2025 07:00  --------------------------------------------------------  IN: 300 mL / OUT: 500 mL / NET: -200 mL      CAPILLARY BLOOD GLUCOSE        PHYSICAL EXAM:  Physical Exam: GENERAL: NAD  HEAD:  Atraumatic, normocephalic  EYES: EOMI, PERRL  NECK: Supple, trachea midline, no JVD  HEART: Regular rate and rhythm  LUNGS: Unlabored respirations. Clear to auscultation bilaterally, no crackles, wheezing, or rhonchi  ABDOMEN: Soft, nontender, nondistended, +BS  EXTREMITIES: 2+ peripheral pulses bilaterally. LLE ttp, LROM  NERVOUS SYSTEM:  A&Ox1, no gross focal deficits      Labs                          8.0    7.77  )-----------( 214      ( 10 Jul 2025 07:19 )             24.9     07-10    138  |  108  |  36[H]  ----------------------------<  81  4.3   |  24  |  0.94    Ca    8.1[L]      10 Jul 2025 07:19            Urinalysis Basic - ( 10 Jul 2025 07:19 )    Color: x / Appearance: x / SG: x / pH: x  Gluc: 81 mg/dL / Ketone: x  / Bili: x / Urobili: x   Blood: x / Protein: x / Nitrite: x   Leuk Esterase: x / RBC: x / WBC x   Sq Epi: x / Non Sq Epi: x / Bacteria: x                      Radiology and Imaging reviewed.

## 2025-07-10 NOTE — PROGRESS NOTE ADULT - NUTRITIONAL ASSESSMENT
This patient has been assessed with a concern for Malnutrition and has been determined to have a diagnosis/diagnoses of Severe protein-calorie malnutrition.    This patient is being managed with:   Diet Regular-  DASH/TLC {Sodium & Cholesterol Restricted}  Pesco Vegetarian (Accepts Fish)  Entered: Jul 8 2025 12:50PM    The following pending diet order is being considered for treatment of Severe protein-calorie malnutrition:  Diet Easy to Chew-  [employer]es[employer] Vegetarian (Accepts Fish)  Supplement Feeding Modality:  Oral  Ensure Plus High Protein Cans or Servings Per Day:  1       Frequency:  Two Times a day  Entered: Jul 8 2025  1:22PM  
This patient has been assessed with a concern for Malnutrition and has been determined to have a diagnosis/diagnoses of Severe protein-calorie malnutrition.    This patient is being managed with:   Diet Easy to Chew-  [employer]esco Vegetarian (Accepts Fish)  Supplement Feeding Modality:  Oral  Ensure Plus High Protein Cans or Servings Per Day:  1       Frequency:  Two Times a day  Entered: Jul 8 2025  1:22PM  
This patient has been assessed with a concern for Malnutrition and has been determined to have a diagnosis/diagnoses of Severe protein-calorie malnutrition.    This patient is being managed with:   Diet Minced and Moist-  Entered: Jul 10 2025  1:07PM  
This patient has been assessed with a concern for Malnutrition and has been determined to have a diagnosis/diagnoses of Severe protein-calorie malnutrition.    This patient is being managed with:   Diet Easy to Chew-  Pesco Vegetarian (Accepts Fish)  Supplement Feeding Modality:  Oral  Ensure Plus High Protein Cans or Servings Per Day:  1       Frequency:  Two Times a day  Entered: Jul 8 2025  1:22PM

## 2025-07-10 NOTE — PROGRESS NOTE ADULT - SUBJECTIVE AND OBJECTIVE BOX
Pt seen and examined at bedside. No acute events overnight. Pain controlled, denies any numbness or tingling. Denies nausea, vomiting, chest pain, or shortness of breath.         LABS:                        8.7    8.58  )-----------( 218      ( 09 Jul 2025 06:15 )             27.3     07-09    139  |  108  |  31[H]  ----------------------------<  89  4.1   |  25  |  1.04    Ca    8.4[L]      09 Jul 2025 06:15        Urinalysis Basic - ( 09 Jul 2025 06:15 )    Color: x / Appearance: x / SG: x / pH: x  Gluc: 89 mg/dL / Ketone: x  / Bili: x / Urobili: x   Blood: x / Protein: x / Nitrite: x   Leuk Esterase: x / RBC: x / WBC x   Sq Epi: x / Non Sq Epi: x / Bacteria: x        VITAL SIGNS:  T(C): 37.1 (07-09-25 @ 23:45), Max: 37.1 (07-09-25 @ 23:45)  HR: 72 (07-09-25 @ 23:45) (72 - 105)  BP: 109/62 (07-09-25 @ 23:45) (109/62 - 177/83)  RR: 18 (07-09-25 @ 23:45) (18 - 19)  SpO2: 94% (07-09-25 @ 23:45) (94% - 98%)        EXAM:  Gen: NAD  Left Lower Extremity:  Distal dressing with minimal strikethrough otherwise dressing clean/dry/intact  Motor: +EHL/FHL/TA/GSc  Sensory: SILT SPN, DPN, Tib, Saph, Bart  Soft compartments  NTTP of calf   DP+     A/P:  89yFemale POD 3 sp L IMN  - WBAT  - PT/OT: PT Rec CLIFF  - Pain control as needed  - Ice as needed  - DVT ppx: home A81 and lovenox   - Encourage incentive spirometry  - Dispo: [facility] per PT recs    Discussed with Dr. Orozco and will update with any further recommendations

## 2025-07-10 NOTE — PROGRESS NOTE ADULT - ASSESSMENT
89-year-old female with past medical history of dementia, CKD, hypertension, dysphagia, TIA, osteoarthritis who presented to the ED after a fall.  XR LLE: left IT fx. orthopedic surgery was consulted; recommends medicine admission 2/2 dementia. Planned for OR today. Admit to medicine.       Problem/Plan - 1:  ·  Problem: Hip fracture, left.   ·  Plan: - imaging reviewed as above  - Orthopedic surgery recommended medicine admission  s/p ORIF S/P left IMN POD # 1  - Pain control with tylenol and morphine.     Problem/Plan - 2:  ·  Problem: Fall.   ·  Plan: - fell out of bed s per home aide as per chart review   - unable to obtain collateral info att   - fall precautions.     Problem/Plan - 3:  ·  Problem: Dementia.   ·  Plan: - unclear baseline, AAOx1-2 at this time  - Feeding assistance  - fall precautions  - aspiration precautions.     Problem/Plan - 4:  ·  Problem: CKD (chronic kidney disease).   ·  Plan: - Cr wnl   - Avoid nephrotoxin  - i/os  - f/u am labs, monitor electrolyte closely and replete as needed.     Problem/Plan - 5:  ·  Problem: HTN (hypertension).   ·  Plan: stable BP   unknown home meds, f/u in am.     Problem/Plan - 6:  ·  Problem: Prophylactic measure.   ·  Plan: - DVT ppx: hold for procedure   - GI ppx: not required att  - Reassess need daily.     Problem/Plan - 7:  ·  Problem: Preoperative examination.   -please see admission note for preoperative assessment.   
89-year-old female with past medical history of dementia, CKD, hypertension, dysphagia, TIA, osteoarthritis who presented to the ED after a fall.  XR LLE: left IT fx. orthopedic surgery was consulted; recommends medicine admission 2/2 dementia. Planned for OR today. Admit to medicine.       Problem/Plan - 1:  ·  Problem: Hip fracture, left.   ·  Plan: - imaging reviewed as above  - Orthopedic surgery recommended medicine admission  - Surgery planned today    - Keep NPO  - Pain control with tylenol and morphine.     Problem/Plan - 2:  ·  Problem: Fall.   ·  Plan: - fell out of bed s per home aide as per chart review   - unable to obtain collateral info att   - fall precautions.     Problem/Plan - 3:  ·  Problem: Dementia.   ·  Plan: - unclear baseline, AAOx1-2 at this time  - Feeding assistance  - fall precautions  - aspiration precautions.     Problem/Plan - 4:  ·  Problem: CKD (chronic kidney disease).   ·  Plan: - Cr wnl   - Avoid nephrotoxin  - i/os  - f/u am labs, monitor electrolyte closely and replete as needed.     Problem/Plan - 5:  ·  Problem: HTN (hypertension).   ·  Plan: stable BP   unknown home meds, f/u in am.     Problem/Plan - 6:  ·  Problem: Prophylactic measure.   ·  Plan: - DVT ppx: hold for procedure   - GI ppx: not required att  - Reassess need daily.     Problem/Plan - 7:  ·  Problem: Preoperative examination.   -please see admission note for preoperative assessment.     
89-year-old female with past medical history of dementia, CKD, hypertension, dysphagia, TIA, osteoarthritis who presented to the ED after a fall.  XR LLE: left IT fx. orthopedic surgery was consulted; recommends medicine admission 2/2 dementia. Planned for OR today. Admit to medicine.       Problem/Plan - 1:  ·  Problem: Hip fracture, left.   ·  Plan: - imaging reviewed as above  - Orthopedic surgery recommended medicine admission  s/p ORIF S/P left IMN POD # 1  - Pain control with tylenol and morphine.     Problem/Plan - 2:  ·  Problem: Fall.   ·  Plan: - fell out of bed s per home aide as per chart review   - unable to obtain collateral info att   - fall precautions.     Problem/Plan - 3:  ·  Problem: Dementia.   ·  Plan: - unclear baseline, AAOx1-2 at this time  - Feeding assistance  - fall precautions  - aspiration precautions.     Problem/Plan - 4:  ·  Problem: CKD (chronic kidney disease).   ·  Plan: - Cr wnl   - Avoid nephrotoxin  - i/os  - f/u am labs, monitor electrolyte closely and replete as needed.     Problem/Plan - 5:  ·  Problem: HTN (hypertension).   ·  Plan: stable BP   unknown home meds, f/u in am.     Problem/Plan - 6:  ·  Problem: Prophylactic measure.   ·  Plan: - DVT ppx: hold for procedure   - GI ppx: not required att  - Reassess need daily.     Problem/Plan - 7:  ·  Problem: Preoperative examination.   -please see admission note for preoperative assessment.     
89-year-old female with past medical history of dementia, CKD, hypertension, dysphagia, TIA, osteoarthritis who presented to the ED after a fall.  XR LLE: left IT fx. orthopedic surgery was consulted; recommends medicine admission 2/2 dementia. Planned for OR today. Admit to medicine.       Problem/Plan - 1:  ·  Problem: Hip fracture, left.   ·  Plan: - imaging reviewed as above  - Orthopedic surgery recommended medicine admission  - Surgery planned for am   - Keep NPO  - Pain control with tylenol and morphine.     Problem/Plan - 2:  ·  Problem: Fall.   ·  Plan: - fell out of bed s per home aide as per chart review   - unable to obtain collateral info att   - fall precautions.     Problem/Plan - 3:  ·  Problem: Dementia.   ·  Plan: - unclear baseline, AAOx1-2 at this time  - Feeding assistance  - fall precautions  - aspiration precautions.     Problem/Plan - 4:  ·  Problem: CKD (chronic kidney disease).   ·  Plan: - Cr wnl   - Avoid nephrotoxin  - i/os  - f/u am labs, monitor electrolyte closely and replete as needed.     Problem/Plan - 5:  ·  Problem: HTN (hypertension).   ·  Plan: stable BP   unknown home meds, f/u in am.     Problem/Plan - 6:  ·  Problem: Prophylactic measure.   ·  Plan: - DVT ppx: hold for procedure   - GI ppx: not required att  - Reassess need daily.     Problem/Plan - 7:  ·  Problem: Preoperative examination.   -please see admission note for preoperative assessment.     
89-year-old female with past medical history of dementia, CKD, hypertension, dysphagia, TIA, osteoarthritis who presented to the ED after a fall.  XR LLE: left IT fx. orthopedic surgery was consulted; recommends medicine admission 2/2 dementia s/p IMN pinning stable for discharge to rehab.       Problem/Plan - 1:  ·  Problem: Hip fracture, left.   ·  Plan: - imaging reviewed as above  - Orthopedic surgery recommended medicine admission  s/p ORIF S/P left IMN POD  - Pain control with tylenol and morphine.     Problem/Plan - 2:  ·  Problem: Fall.   ·  Plan: - fell out of bed s per home aide as per chart review   - unable to obtain collateral info att   - fall precautions.     Problem/Plan - 3:  ·  Problem: Dementia.   ·  Plan: - unclear baseline, AAOx1-2 at this time  - Feeding assistance  - fall precautions  - aspiration precautions.     Problem/Plan - 4:  ·  Problem: CKD (chronic kidney disease).   ·  Plan: - Cr wnl   - Avoid nephrotoxin  - i/os  - f/u am labs, monitor electrolyte closely and replete as needed.     Problem/Plan - 5:  ·  Problem: HTN (hypertension).   ·  Plan: stable BP   unknown home meds, f/u in am.     Problem/Plan - 6:  ·  Problem: Prophylactic measure.   ·  Plan: - DVT ppx: hold for procedure   - GI ppx: not required att  - Reassess need daily.     Problem/Plan - 7:  ·  Problem: Preoperative examination.   -please see admission note for preoperative assessment.

## 2025-07-11 ENCOUNTER — TRANSCRIPTION ENCOUNTER (OUTPATIENT)
Age: 89
End: 2025-07-11

## 2025-07-11 VITALS — HEART RATE: 102 BPM | OXYGEN SATURATION: 97 % | DIASTOLIC BLOOD PRESSURE: 64 MMHG | SYSTOLIC BLOOD PRESSURE: 133 MMHG

## 2025-07-11 LAB
ANION GAP SERPL CALC-SCNC: 3 MMOL/L — LOW (ref 5–17)
BUN SERPL-MCNC: 40 MG/DL — HIGH (ref 7–23)
CALCIUM SERPL-MCNC: 8.8 MG/DL — SIGNIFICANT CHANGE UP (ref 8.5–10.1)
CHLORIDE SERPL-SCNC: 107 MMOL/L — SIGNIFICANT CHANGE UP (ref 96–108)
CO2 SERPL-SCNC: 28 MMOL/L — SIGNIFICANT CHANGE UP (ref 22–31)
CREAT SERPL-MCNC: 1.07 MG/DL — SIGNIFICANT CHANGE UP (ref 0.5–1.3)
EGFR: 48 ML/MIN/1.73M2 — LOW
EGFR: 48 ML/MIN/1.73M2 — LOW
GLUCOSE SERPL-MCNC: 92 MG/DL — SIGNIFICANT CHANGE UP (ref 70–99)
HCT VFR BLD CALC: 26.3 % — LOW (ref 34.5–45)
HGB BLD-MCNC: 8.5 G/DL — LOW (ref 11.5–15.5)
MCHC RBC-ENTMCNC: 32.2 PG — SIGNIFICANT CHANGE UP (ref 27–34)
MCHC RBC-ENTMCNC: 32.3 G/DL — SIGNIFICANT CHANGE UP (ref 32–36)
MCV RBC AUTO: 99.6 FL — SIGNIFICANT CHANGE UP (ref 80–100)
NRBC BLD AUTO-RTO: 0 /100 WBCS — SIGNIFICANT CHANGE UP (ref 0–0)
PLATELET # BLD AUTO: 249 K/UL — SIGNIFICANT CHANGE UP (ref 150–400)
POTASSIUM SERPL-MCNC: 4.8 MMOL/L — SIGNIFICANT CHANGE UP (ref 3.5–5.3)
POTASSIUM SERPL-SCNC: 4.8 MMOL/L — SIGNIFICANT CHANGE UP (ref 3.5–5.3)
RBC # BLD: 2.64 M/UL — LOW (ref 3.8–5.2)
RBC # FLD: 14.1 % — SIGNIFICANT CHANGE UP (ref 10.3–14.5)
SODIUM SERPL-SCNC: 138 MMOL/L — SIGNIFICANT CHANGE UP (ref 135–145)
WBC # BLD: 9.68 K/UL — SIGNIFICANT CHANGE UP (ref 3.8–10.5)
WBC # FLD AUTO: 9.68 K/UL — SIGNIFICANT CHANGE UP (ref 3.8–10.5)

## 2025-07-11 PROCEDURE — 99239 HOSP IP/OBS DSCHRG MGMT >30: CPT

## 2025-07-11 RX ORDER — SENNA 187 MG
2 TABLET ORAL
Qty: 0 | Refills: 0 | DISCHARGE
Start: 2025-07-11

## 2025-07-11 RX ORDER — ASPIRIN 325 MG
1 TABLET ORAL
Qty: 0 | Refills: 0 | DISCHARGE
Start: 2025-07-11

## 2025-07-11 RX ORDER — B1/B2/B3/B5/B6/B12/VIT C/FOLIC 500-0.5 MG
1 TABLET ORAL
Qty: 0 | Refills: 0 | DISCHARGE
Start: 2025-07-11

## 2025-07-11 RX ORDER — POLYETHYLENE GLYCOL 3350 17 G/17G
17 POWDER, FOR SOLUTION ORAL
Qty: 0 | Refills: 0 | DISCHARGE
Start: 2025-07-11

## 2025-07-11 RX ADMIN — Medication 975 MILLIGRAM(S): at 13:14

## 2025-07-11 RX ADMIN — Medication 81 MILLIGRAM(S): at 13:13

## 2025-07-11 RX ADMIN — OXYCODONE HYDROCHLORIDE 5 MILLIGRAM(S): 30 TABLET ORAL at 08:31

## 2025-07-11 RX ADMIN — Medication 975 MILLIGRAM(S): at 14:14

## 2025-07-11 RX ADMIN — OXYCODONE HYDROCHLORIDE 5 MILLIGRAM(S): 30 TABLET ORAL at 09:31

## 2025-07-11 RX ADMIN — Medication 975 MILLIGRAM(S): at 05:15

## 2025-07-11 RX ADMIN — ENOXAPARIN SODIUM 40 MILLIGRAM(S): 100 INJECTION SUBCUTANEOUS at 05:15

## 2025-07-11 RX ADMIN — Medication 40 MILLIGRAM(S): at 06:00

## 2025-07-11 RX ADMIN — Medication 975 MILLIGRAM(S): at 06:15

## 2025-07-11 RX ADMIN — Medication 10 MILLIGRAM(S): at 08:31

## 2025-07-11 RX ADMIN — Medication 1 TABLET(S): at 13:14

## 2025-07-11 NOTE — DISCHARGE NOTE NURSING/CASE MANAGEMENT/SOCIAL WORK - FINANCIAL ASSISTANCE
[facility] provides services at a reduced cost to those who are determined to be eligible through [facility]’s financial assistance program. Information regarding [facility]’s financial assistance program can be found by going to [email or web address] or by calling [phone].

## 2025-07-11 NOTE — PROGRESS NOTE ADULT - SUBJECTIVE AND OBJECTIVE BOX
Patient seen and examined at bedside. No acute complaints and pain is well controlled. Denies numbness/tingling, nausea, vomiting, shortness of breath, chest pain.    LABS:                        8.0    7.77  )-----------( 214      ( 10 Jul 2025 07:19 )             24.9     07-10    138  |  108  |  36[H]  ----------------------------<  81  4.3   |  24  |  0.94    Ca    8.1[L]      10 Jul 2025 07:19        Urinalysis Basic - ( 10 Jul 2025 07:19 )    Color: x / Appearance: x / SG: x / pH: x  Gluc: 81 mg/dL / Ketone: x  / Bili: x / Urobili: x   Blood: x / Protein: x / Nitrite: x   Leuk Esterase: x / RBC: x / WBC x   Sq Epi: x / Non Sq Epi: x / Bacteria: x        VITAL SIGNS:  T(C): 36.7 (07-11-25 @ 04:46), Max: 36.8 (07-10-25 @ 10:35)  HR: 75 (07-11-25 @ 04:46) (65 - 75)  BP: 131/75 (07-11-25 @ 04:46) (112/64 - 131/75)  RR: 17 (07-11-25 @ 04:46) (17 - 19)  SpO2: 94% (07-11-25 @ 04:46) (93% - 100%)    Gen: NAD    Left Lower Extremity:  Dressing clean/dry/intact  Soft compartments  Negative calf ttp  +EHL/FHL/TA/GSc  SILT SPN, DPN, Tib, Saph, Bart  DP+     A&P  S/p L Hip IMN 7/7  -Cont PT/OT  -WBAT  -DVT Proph w/ Lovenox & A81  -Encourage incentive spirometry  -Patient accepted for DC to Rehab, pending bed availability  Plan discussed with Dr. Orozco who agrees with above plan, any updates will be documented

## 2025-07-11 NOTE — PROGRESS NOTE ADULT - REASON FOR ADMISSION
Hip fracture

## 2025-07-11 NOTE — PROGRESS NOTE ADULT - SUBJECTIVE AND OBJECTIVE BOX
Patient seen and examined at bedside. Overall patient still endorses pain but states it is well controlled. No other acute complaints. Denies nausea, vomiting, numbness/tingling, chest pain, shortness of breath.     LABS:                        8.0    7.77  )-----------( 214      ( 10 Jul 2025 07:19 )             24.9     07-10    138  |  108  |  36[H]  ----------------------------<  81  4.3   |  24  |  0.94    Ca    8.1[L]      10 Jul 2025 07:19        Urinalysis Basic - ( 10 Jul 2025 07:19 )    Color: x / Appearance: x / SG: x / pH: x  Gluc: 81 mg/dL / Ketone: x  / Bili: x / Urobili: x   Blood: x / Protein: x / Nitrite: x   Leuk Esterase: x / RBC: x / WBC x   Sq Epi: x / Non Sq Epi: x / Bacteria: x        VITAL SIGNS:  T(C): 36.7 (07-11-25 @ 04:46), Max: 36.8 (07-10-25 @ 10:35)  HR: 75 (07-11-25 @ 04:46) (65 - 75)  BP: 131/75 (07-11-25 @ 04:46) (112/64 - 131/75)  RR: 17 (07-11-25 @ 04:46) (17 - 19)  SpO2: 94% (07-11-25 @ 04:46) (93% - 100%)    Gen: NAD    Left Lower Extremity:  Leg shortened and externally rotated  Soft compartments  Negative calf ttp  +EHL/FHL/TA/GSc  SILT SPN, DPN, Tib, Saph, Bart  DP+     A&P:  Left femoral neck fracture  -Plan for OR today, AMARI with Dr. Lucas  -NWB E  - Medical clearance for OR  -Hold DVT Proph, resume POD1  -Perioperative abx  -PT/OT post op  -Plan discussed with patient who verbalized understanding and agreement with above plan  Plan discussed with Dr. Lucas who agrees with above plan

## 2025-07-11 NOTE — DISCHARGE NOTE NURSING/CASE MANAGEMENT/SOCIAL WORK - PATIENT PORTAL LINK FT
Pt.  Currently in ultrasound.   Will come back to room ED #9.     Samantha Ahumada RN  11/09/22 1997 You can access the FollowMyHealth Patient Portal offered by [facility] by registering at the following website: [email or web address]. By joining [facility]’s FollowMyHealth portal, you will also be able to view your health information using other applications (apps) compatible with our system.

## 2025-07-11 NOTE — PROGRESS NOTE ADULT - PROVIDER SPECIALTY LIST ADULT
Hospitalist
Internal Medicine
Orthopedics
Orthopedics
Hospitalist
Orthopedics
Internal Medicine
Internal Medicine

## 2025-07-16 PROBLEM — F03.90 UNSPECIFIED DEMENTIA, UNSPECIFIED SEVERITY, WITHOUT BEHAVIORAL DISTURBANCE, PSYCHOTIC DISTURBANCE, MOOD DISTURBANCE, AND ANXIETY: Chronic | Status: ACTIVE | Noted: 2025-07-06

## 2025-07-16 PROBLEM — G45.9 TRANSIENT CEREBRAL ISCHEMIC ATTACK, UNSPECIFIED: Chronic | Status: ACTIVE | Noted: 2025-07-06

## 2025-07-16 PROBLEM — N18.9 CHRONIC KIDNEY DISEASE, UNSPECIFIED: Chronic | Status: ACTIVE | Noted: 2025-07-06

## 2025-07-16 PROBLEM — R13.10 DYSPHAGIA, UNSPECIFIED: Chronic | Status: ACTIVE | Noted: 2025-07-06

## 2025-07-16 PROBLEM — I10 ESSENTIAL (PRIMARY) HYPERTENSION: Chronic | Status: ACTIVE | Noted: 2025-07-06

## 2025-07-16 NOTE — ED ADULT NURSE NOTE - GENITOURINARY WDL
[facility]  [address] [facility] [address] [address]  [phone]  FACILITY: [facility]  Code 32  Follow up visit       NAME: Farida Whitaker  AGE: 89 y.o. SEX: female CODE STATUS: No CPR    DATE OF ENCOUNTER: 7/16/25    Assessment and Plan     1. Late onset Alzheimer's disease without behavioral disturbance (HCC)  Assessment & Plan:  Continues to slowly decline  Continue folic acid, vit D, vitamin B12 and sertraline   Continues to require help with ADLs  Redirection, reorientation and distraction as appropriate   Fall/safety precautions  Supportive care, assistance with ADLs   Avoid deliriogenic medications   Encourage adequate hydration and nutrition   Maintain sleep/wake cycle  2. Essential hypertension  Assessment & Plan:  BP stable, SBP generally 110s-150s  No acute cardiac complaints   Avoid hypotension   Will increase Lisinopril to 5mg daily with hold parameters    Continue monitoring BP      3. Gastroesophageal reflux disease without esophagitis  Assessment & Plan:  No reported concerns  Not presently on medication  Avoid citrus, spicy, caffeine, and chocolate  Avoid eating/drinking 1 hour prior to laying down  OOB with meals  Continue monitoring   4. Type 2 diabetes mellitus without complication, with long-term current use of insulin ([facility])  Assessment & Plan:    Lab Results   Component Value Date    HGBA1C 8.2 (H) 02/21/2024 5/8 A1c 5.8  Well controlled for age  Avoid hypoglycemia  Continue Lantus 9 units qHS  Continue monitoring blood sugars  Continue with diabetic diet   5. Ambulatory dysfunction  Assessment & Plan:  Multifactorial- acute and chronic conditions  Assist with ADLs  Encourage PO nutrition and hydration.  Encourage appropriate DME use   Maintain fall and safety precautions.         All medications and routine orders were reviewed and updated as needed.    Chief Complaint     LT follow up visit    Past Medical and Surgica History      Past Medical History[1]  Past Surgical  History[2]  Allergies   Allergen Reactions    Other      All dairy products     Tilactase         History of Present Illness     Farida Whitaker is a 89-year-old female patient residing at [facility]. Patient being seen and examined today for follow up on acute and chronic medical conditions. Patient appears to have poor comprehension. ROS of systems difficult due to cognition. Per nursing patients mood is stable, patient at baseline, she often spends time in the day room with other residents. Her appetite appears to be fair completing % of meals, weights stable.  Patients last BM 7/14 per records. Patient is in no acute distress.   Nursing note and labs reviewed.       The patient's allergies, past medical, surgical, social and family history were reviewed and unchanged.    Review of Systems     Review of Systems   Reason unable to perform ROS: limited due to cognition.   Constitutional:  Negative for chills and fever.   Respiratory:  Negative for shortness of breath.    Cardiovascular:  Negative for chest pain.   Genitourinary:  Negative for difficulty urinating and dysuria.   All other systems reviewed and are negative.      Objective     Vitals:   Vitals:    07/16/25 0704   BP: 156/74   Pulse: 61   Resp: 18   Temp: 97.7 °F (36.5 °C)   SpO2: 99%       Physical Exam  Vitals and nursing note reviewed.   Constitutional:       General: She is not in acute distress.     Appearance: Normal appearance. She is not ill-appearing.   HENT:      Head: Normocephalic and atraumatic.      Right Ear: External ear normal.      Left Ear: External ear normal.      Nose: Nose normal. No congestion or rhinorrhea.      Mouth/Throat:      Mouth: Mucous membranes are dry.     Eyes:      General: No scleral icterus.        Right eye: No discharge.         Left eye: No discharge.      Conjunctiva/sclera: Conjunctivae normal.       Cardiovascular:      Rate and Rhythm: Normal rate and regular rhythm.   Pulmonary:      Effort: No  "respiratory distress.      Breath sounds: No wheezing, rhonchi or rales.   Abdominal:      General: Bowel sounds are normal. There is no distension.      Tenderness: There is no abdominal tenderness. There is no guarding or rebound.     Musculoskeletal:      Right lower leg: Edema present.      Left lower leg: Edema present.      Comments: Trace BLE edema      Skin:     General: Skin is warm and dry.     Neurological:      Mental Status: She is alert. Mental status is at baseline.      Motor: Weakness present.      Gait: Gait abnormal.     Psychiatric:         Mood and Affect: Mood normal.         Pertinent Laboratory/Diagnostic Studies:   Reviewed in facility chart-stable    Current Medications   Medications reviewed and updated see facility MAR for details.    Current Medications[3]       Please note:  Voice-recognition software may have been used in the preparation of this document.  Occasional wrong word or \"sound-alike\" substitutions may have occurred due to the inherent limitations of voice recognition software.  Interpretation should be guided by context.         AWA [place]         [1]   Past Medical History:  Diagnosis Date    Alzheimer's dementia ([facility])     COVID-19 2020    Depression     Ground glass opacity present on imaging of lung 2020    HLD (hyperlipidemia)     Hypothyroid     Low O2 saturation 2020    Neurocognitive disorder     T2DM (type 2 diabetes mellitus) ([facility])    [2]   Past Surgical History:  Procedure Laterality Date     SECTION      HYSTERECTOMY     [3]   Current Outpatient Medications:     acetaminophen (TYLENOL) 325 mg tablet, Take 650 mg by mouth every 6 (six) hours as needed for mild pain, Disp: , Rfl:     acetaminophen (TYLENOL) 325 mg tablet, Take 975 mg by mouth every 8 (eight) hours, Disp: , Rfl:     cholecalciferol (VITAMIN D3) 1,000 units tablet, Take 1,000 Units by mouth in the morning., Disp: , Rfl:     folic acid (FOLVITE) 1 mg tablet, Take 1 mg by " mouth in the morning., Disp: , Rfl:     insulin glargine (LANTUS) 100 units/mL subcutaneous injection, Inject 9 Units under the skin daily at bedtime, Disp: , Rfl:     lactase (LACTAID) 3,000 units tablet, Take 9,000 Units by mouth in the morning and 9,000 Units at noon and 9,000 Units in the evening. Take with meals., Disp: , Rfl:     levothyroxine 125 mcg tablet, Take 125 mcg by mouth in the morning., Disp: , Rfl:     lisinopril (ZESTRIL) 2.5 mg tablet, Take 5 mg by mouth in the morning., Disp: , Rfl:     polyethylene glycol (MIRALAX) 17 g packet, Take 17 g by mouth daily, Disp: 14 each, Rfl: 0    senna (SENOKOT) 8.6 mg, Take 1 tablet (8.6 mg total) by mouth daily at bedtime, Disp: 120 each, Rfl: 0    sertraline (ZOLOFT) 50 mg tablet, Take 50 mg by mouth in the morning., Disp: , Rfl:     vitamin B-12 (VITAMIN B-12) 1,000 mcg tablet, Take by mouth in the morning., Disp: , Rfl:      Bladder non-tender and non-distended.

## 2025-07-18 DIAGNOSIS — Z86.73 PERSONAL HISTORY OF TRANSIENT ISCHEMIC ATTACK (TIA), AND CEREBRAL INFARCTION WITHOUT RESIDUAL DEFICITS: ICD-10-CM

## 2025-07-18 DIAGNOSIS — R13.10 DYSPHAGIA, UNSPECIFIED: ICD-10-CM

## 2025-07-18 DIAGNOSIS — I12.9 HYPERTENSIVE CHRONIC KIDNEY DISEASE WITH STAGE 1 THROUGH STAGE 4 CHRONIC KIDNEY DISEASE, OR UNSPECIFIED CHRONIC KIDNEY DISEASE: ICD-10-CM

## 2025-07-18 DIAGNOSIS — M19.90 UNSPECIFIED OSTEOARTHRITIS, UNSPECIFIED SITE: ICD-10-CM

## 2025-07-18 DIAGNOSIS — E43 UNSPECIFIED SEVERE PROTEIN-CALORIE MALNUTRITION: ICD-10-CM

## 2025-07-18 DIAGNOSIS — S72.142A DISPLACED INTERTROCHANTERIC FRACTURE OF LEFT FEMUR, INITIAL ENCOUNTER FOR CLOSED FRACTURE: ICD-10-CM
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
## 2025-07-18 DIAGNOSIS — N18.9 CHRONIC KIDNEY DISEASE, UNSPECIFIED: ICD-10-CM
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
## 2025-07-21 DIAGNOSIS — Z00.00 ENCOUNTER FOR GENERAL ADULT MEDICAL EXAMINATION W/OUT ABNORMAL FINDINGS: ICD-10-CM

## 2025-07-23 ENCOUNTER — APPOINTMENT (OUTPATIENT)
Dept: ORTHOPEDIC SURGERY | Facility: CLINIC | Age: 89
End: 2025-07-23
Payer: MEDICARE

## 2025-07-23 DIAGNOSIS — S72.142A DISPLACED INTERTROCHANTERIC FRACTURE OF LEFT FEMUR, INITIAL ENCOUNTER FOR CLOSED FRACTURE: ICD-10-CM

## 2025-07-23 PROCEDURE — 73502 X-RAY EXAM HIP UNI 2-3 VIEWS: CPT | Mod: LT

## 2025-07-23 PROCEDURE — 99024 POSTOP FOLLOW-UP VISIT: CPT

## 2025-08-27 ENCOUNTER — APPOINTMENT (OUTPATIENT)
Dept: ORTHOPEDIC SURGERY | Facility: CLINIC | Age: 89
End: 2025-08-27
Payer: MEDICARE

## 2025-08-27 DIAGNOSIS — S72.142A DISPLACED INTERTROCHANTERIC FRACTURE OF LEFT FEMUR, INITIAL ENCOUNTER FOR CLOSED FRACTURE: ICD-10-CM

## 2025-08-27 PROCEDURE — 99024 POSTOP FOLLOW-UP VISIT: CPT

## 2025-08-27 PROCEDURE — 73502 X-RAY EXAM HIP UNI 2-3 VIEWS: CPT | Mod: LT

## (undated) DEVICE — Device

## (undated) DEVICE — SUT VICRYL 5-0 18" PS-2 UNDYED

## (undated) DEVICE — DRILL BIT STRYKER ORTHO LOKG 4.2X360MM

## (undated) DEVICE — DRSG TEGADERM 4 X 4.75"

## (undated) DEVICE — PACK ORTHO

## (undated) DEVICE — DRSG ALLEVYN GENTLE BORDER 3X3"

## (undated) DEVICE — STAPLER SKIN MULTI DIRECTION W35

## (undated) DEVICE — DRAPE C ARM 41X140"

## (undated) DEVICE — DRSG 4 X 4" 4PLY STERILE

## (undated) DEVICE — VENODYNE/SCD SLEEVE CALF MEDIUM

## (undated) DEVICE — SUT VICRYL PLUS 0 27" CT-2 UNDYED

## (undated) DEVICE — DRAPE SHOWER CURTAIN ISOLATION